# Patient Record
Sex: FEMALE
[De-identification: names, ages, dates, MRNs, and addresses within clinical notes are randomized per-mention and may not be internally consistent; named-entity substitution may affect disease eponyms.]

---

## 2018-10-14 ENCOUNTER — TRANSCRIPTION ENCOUNTER (OUTPATIENT)
Age: 33
End: 2018-10-14

## 2018-10-17 ENCOUNTER — TRANSCRIPTION ENCOUNTER (OUTPATIENT)
Age: 33
End: 2018-10-17

## 2018-11-09 ENCOUNTER — TRANSCRIPTION ENCOUNTER (OUTPATIENT)
Age: 33
End: 2018-11-09

## 2021-05-15 ENCOUNTER — TRANSCRIPTION ENCOUNTER (OUTPATIENT)
Age: 36
End: 2021-05-15

## 2022-01-19 PROBLEM — Z00.00 ENCOUNTER FOR PREVENTIVE HEALTH EXAMINATION: Status: ACTIVE | Noted: 2022-01-19

## 2022-01-20 ENCOUNTER — APPOINTMENT (OUTPATIENT)
Dept: ENDOCRINOLOGY | Facility: CLINIC | Age: 37
End: 2022-01-20
Payer: COMMERCIAL

## 2022-01-20 VITALS
OXYGEN SATURATION: 98 % | RESPIRATION RATE: 17 BRPM | HEIGHT: 63 IN | TEMPERATURE: 97.4 F | SYSTOLIC BLOOD PRESSURE: 137 MMHG | HEART RATE: 115 BPM | DIASTOLIC BLOOD PRESSURE: 82 MMHG | BODY MASS INDEX: 51.91 KG/M2 | WEIGHT: 293 LBS

## 2022-01-20 DIAGNOSIS — Z80.8 FAMILY HISTORY OF MALIGNANT NEOPLASM OF OTHER ORGANS OR SYSTEMS: ICD-10-CM

## 2022-01-20 DIAGNOSIS — Z87.891 PERSONAL HISTORY OF NICOTINE DEPENDENCE: ICD-10-CM

## 2022-01-20 DIAGNOSIS — R94.6 ABNORMAL RESULTS OF THYROID FUNCTION STUDIES: ICD-10-CM

## 2022-01-20 PROCEDURE — 99244 OFF/OP CNSLTJ NEW/EST MOD 40: CPT | Mod: 25

## 2022-01-20 PROCEDURE — 36415 COLL VENOUS BLD VENIPUNCTURE: CPT

## 2022-01-20 RX ORDER — OLMESARTAN MEDOXOMIL / AMLODIPINE BESYLATE / HYDROCHLOROTHIAZIDE 40; 10; 25 MG/1; MG/1; MG/1
40-10-25 TABLET, FILM COATED ORAL DAILY
Refills: 0 | Status: ACTIVE | COMMUNITY

## 2022-01-20 NOTE — REASON FOR VISIT
[Consultation] : a consultation visit [Weight Management/Obesity] : weight management/obesity [Other___] : [unfilled]

## 2022-01-20 NOTE — HISTORY OF PRESENT ILLNESS
[FreeTextEntry1] : 36 year female  referred for thyroid and weight management. \par Ms. Robertson  was struggling with obesity since the age of 5 (weighing about 100 lbs at that age). She's been doing WW for many years, and was able to maintain her weight around 300 lbs. She did not have any issues getting pregnant and weighed about 340 lbs when conceived her child. She's now about 9 months postpartum, and her weight today is 363 lbs despite staying on WW. She feels that her diet that typically would help her is not working and she continues to gain weight. She has not tried anything other diet and saw a nutritionist when she was pregnant. She also tried  phentermine with a minimal success. She was not seen by an endocrinologist yet. Her family has been also struggling with obesity, and her dad weighs about 500 lbs. \par  In addition to the weight gain, she noticed some facial hair growth, scalp hair loss, new acne.  Cycle is regular, never on OCP's. Using condoms. She snores, but was never been tested for JAMES.\par Her mother was diagnosed with a thyroid cancer at the age of 32.Bonnie recalls having normal thyroid functions, but never had a formal thyroid sonogram. Denies history of radiation exposure to head and neck area in a childhood.\par

## 2022-01-20 NOTE — CONSULT LETTER
[Dear  ___] : Dear  [unfilled], [Sincerely,] : Sincerely, [FreeTextEntry1] : Thank you for referring  Ms. Bonnie Robertson to me for evaluation and treatment. Please, see attached consultation note. As always, if there are specific questions you would like to discuss, please feel free to contact me.\par Thank you for the courtesy of this evaluation.\par  [FreeTextEntry3] : Leanne Borjas MD, FACE, ECNU\par

## 2022-01-20 NOTE — ASSESSMENT
[FreeTextEntry1] : 1. Morbid obesity. Cushingoid features\par - Current approaches to weight management are discussed with the patient. Suggested extensive nutritional education program. She will see our RD. Proper dietary restrictions and exercise routines discussed. Medical weight loss therapies were reviewed with the patient- would avoid contrave and qsymia as a first line of therapy given her HTN. Will likely start on Wegovy. R+B of GLP1 agonists reviewed. Bariatric options discussed and referral is given.\par - screen for CS\par - 1 mg ONDST and 24h UFC/meta/normetanephrine\par \par 2. Family history of thyroid cancer\par - monitor thyroid functions\par - thyroid US, and periodical screening advised\par \par RTC post labs/sonogram\par

## 2022-01-21 LAB
ALBUMIN SERPL ELPH-MCNC: 4.5 G/DL
ALP BLD-CCNC: 89 U/L
ALT SERPL-CCNC: 18 U/L
ANION GAP SERPL CALC-SCNC: 13 MMOL/L
AST SERPL-CCNC: 17 U/L
BASOPHILS # BLD AUTO: 0.05 K/UL
BASOPHILS NFR BLD AUTO: 0.6 %
BILIRUB SERPL-MCNC: 0.2 MG/DL
BUN SERPL-MCNC: 14 MG/DL
C PEPTIDE SERPL-MCNC: 5.5 NG/ML
CALCIUM SERPL-MCNC: 9.8 MG/DL
CHLORIDE SERPL-SCNC: 101 MMOL/L
CHOLEST SERPL-MCNC: 200 MG/DL
CO2 SERPL-SCNC: 27 MMOL/L
CREAT SERPL-MCNC: 0.74 MG/DL
EOSINOPHIL # BLD AUTO: 0.18 K/UL
EOSINOPHIL NFR BLD AUTO: 2.3 %
ESTIMATED AVERAGE GLUCOSE: 100 MG/DL
ESTRADIOL SERPL-MCNC: 21 PG/ML
FOLATE SERPL-MCNC: 8 NG/ML
FSH SERPL-MCNC: 8.7 IU/L
GLUCOSE SERPL-MCNC: 93 MG/DL
HBA1C MFR BLD HPLC: 5.1 %
HCG SERPL-MCNC: <1 MIU/ML
HCT VFR BLD CALC: 39.2 %
HDLC SERPL-MCNC: 45 MG/DL
HGB BLD-MCNC: 12.2 G/DL
IMM GRANULOCYTES NFR BLD AUTO: 0.1 %
LDLC SERPL CALC-MCNC: 102 MG/DL
LH SERPL-ACNC: 4.8 IU/L
LYMPHOCYTES # BLD AUTO: 2.14 K/UL
LYMPHOCYTES NFR BLD AUTO: 27.6 %
MAN DIFF?: NORMAL
MCHC RBC-ENTMCNC: 24.3 PG
MCHC RBC-ENTMCNC: 31.1 GM/DL
MCV RBC AUTO: 78.1 FL
MONOCYTES # BLD AUTO: 0.62 K/UL
MONOCYTES NFR BLD AUTO: 8 %
NEUTROPHILS # BLD AUTO: 4.76 K/UL
NEUTROPHILS NFR BLD AUTO: 61.4 %
NONHDLC SERPL-MCNC: 155 MG/DL
PLATELET # BLD AUTO: 335 K/UL
POTASSIUM SERPL-SCNC: 4.3 MMOL/L
PROLACTIN SERPL-MCNC: 16.4 NG/ML
PROT SERPL-MCNC: 7.5 G/DL
RBC # BLD: 5.02 M/UL
RBC # FLD: 14.2 %
SODIUM SERPL-SCNC: 141 MMOL/L
T4 FREE SERPL-MCNC: 1.9 NG/DL
THYROGLOB AB SERPL-ACNC: <20 IU/ML
THYROPEROXIDASE AB SERPL IA-ACNC: 278 IU/ML
TRIGL SERPL-MCNC: 267 MG/DL
VIT B12 SERPL-MCNC: 373 PG/ML
WBC # FLD AUTO: 7.76 K/UL

## 2022-01-25 LAB — SHBG SERPL-SCNC: 61.4 NMOL/L

## 2022-01-27 LAB
TESTOST FREE SERPL-MCNC: 1.6 PG/ML
TESTOST SERPL-MCNC: 26.1 NG/DL

## 2022-03-18 LAB
25(OH)D3 SERPL-MCNC: 11.9 NG/ML
ACTH SER-ACNC: <1.5 PG/ML
CORTIS 24H UR-MCNC: 24 H
CORTIS 24H UR-MRATE: 6.8 MCG/24 H
CORTIS SERPL-MCNC: 0.5 UG/DL
CREAT 24H UR-MCNC: 1.7 G/24 H
CREAT ?TM UR-MCNC: 106 MG/DL
DEXAMETHASONE LEVEL: 416 NG/DL
LEPTIN SERPL-MCNC: 40 NG/ML
METANEPH 24H UR-MRATE: 78 UG/24 HR
METANEPHRINE, PL: <10 PG/ML
METANEPHS 24H UR-MCNC: 47 UG/L
NORMETANEPHRINE 24 HR URINE: 442 UG/24 HR
NORMETANEPHRINE 24H UR-MCNC: 268 UG/L
NORMETANEPHRINE, PL: 113.8 PG/ML
PROT ?TM UR-MCNC: 24 HR
SPECIMEN VOL 24H UR: 1650 ML
SPECIMEN VOL 24H UR: 1650 ML
TSH SERPL-ACNC: <0.01 UIU/ML

## 2022-03-28 LAB
T3 SERPL-MCNC: 143 NG/DL
T4 FREE SERPL-MCNC: 1.2 NG/DL
THYROGLOB AB SERPL-ACNC: <20 IU/ML
THYROPEROXIDASE AB SERPL IA-ACNC: 224 IU/ML
TSH RECEPTOR AB: 2.86 IU/L
TSH SERPL-ACNC: 0.06 UIU/ML
TSI ACT/NOR SER: 0.88 IU/L

## 2022-05-11 ENCOUNTER — APPOINTMENT (OUTPATIENT)
Dept: ENDOCRINOLOGY | Facility: CLINIC | Age: 37
End: 2022-05-11
Payer: COMMERCIAL

## 2022-05-11 VITALS
RESPIRATION RATE: 18 BRPM | WEIGHT: 293 LBS | HEART RATE: 99 BPM | BODY MASS INDEX: 51.91 KG/M2 | SYSTOLIC BLOOD PRESSURE: 96 MMHG | TEMPERATURE: 98.3 F | DIASTOLIC BLOOD PRESSURE: 72 MMHG | OXYGEN SATURATION: 98 % | HEIGHT: 63 IN

## 2022-05-11 DIAGNOSIS — E55.9 VITAMIN D DEFICIENCY, UNSPECIFIED: ICD-10-CM

## 2022-05-11 PROCEDURE — 36415 COLL VENOUS BLD VENIPUNCTURE: CPT

## 2022-05-11 PROCEDURE — 99214 OFFICE O/P EST MOD 30 MIN: CPT | Mod: 25

## 2022-05-11 NOTE — HISTORY OF PRESENT ILLNESS
[FreeTextEntry1] : 36 year female  f/u for thyroid and weight management. \par \par *** May 11, 2022 ***\par \par on MMI 5 mg . feels ok overall, but very concerned with a progressive weight gain\par did not see a bariatric surgeon yet\par 1mg ONDST and 24h UFC- normal\par \par leptin- 40 (3.3-18.3)\par \par Thyr US (3/25/22)-right lower pole 1.6 cm hypoechoic nodule.  Left isthmic/left medial pole junction 1.3 cm complex hypoechoic nodule.\par FNA (4/4/22)-right lower pole nodule–benign follicular nodule compatible with lymphocytic thyroiditis, left upper mid pole–benign follicular nodule compatible with lymphocytic thyroiditis\par \par HPI:\par Ms. Robertson  was struggling with obesity since the age of 5 (weighing about 100 lbs at that age). She's been doing WW for many years, and was able to maintain her weight around 300 lbs. She did not have any issues getting pregnant and weighed about 340 lbs when conceived her child. She's now about 9 months postpartum, and her weight today is 363 lbs despite staying on WW. She feels that her diet that typically would help her is not working and she continues to gain weight. She has not tried anything other diet and saw a nutritionist when she was pregnant. She also tried  phentermine with a minimal success. She was not seen by an endocrinologist yet. Her family has been also struggling with obesity, and her dad weighs about 500 lbs. \par  In addition to the weight gain, she noticed some facial hair growth, scalp hair loss, new acne.  Cycle is regular, never on OCP's. Using condoms. She snores, but was never been tested for JAMES.\par Her mother was diagnosed with a thyroid cancer at the age of 32.Bonnie recalls having normal thyroid functions, but never had a formal thyroid sonogram. Denies history of radiation exposure to head and neck area in a childhood.\par

## 2022-05-11 NOTE — REASON FOR VISIT
[Follow - Up] : a follow-up visit [Hyperthyroidism] : hyperthyroidism [Weight Management/Obesity] : weight management/obesity

## 2022-05-11 NOTE — ASSESSMENT
[FreeTextEntry1] : 1. Morbid obesity. Cushingoid features\par - Current approaches to weight management are discussed with the patient. Suggested extensive nutritional education program. She will see our RD. Proper dietary restrictions and exercise routines discussed. Medical weight loss therapies were reviewed with the patient- would avoid contrave and qsymia as a first line of therapy given her HTN.  start on Wegovy. R+B. Bariatric options discussed and referral is given again\par - genetic counseling\par \par 2. Graves disease\par - MMI 5 mg, pending labs\par \par 3. MNG. Family history of thyroid cancer\par - s/p benign FNA\par - repeat thyroid US in 10/22\par \par RTC post labs/sonogram\par

## 2022-05-13 LAB
25(OH)D3 SERPL-MCNC: 21.8 NG/ML
ALBUMIN SERPL ELPH-MCNC: 4.6 G/DL
ALP BLD-CCNC: 94 U/L
ALT SERPL-CCNC: 14 U/L
ANION GAP SERPL CALC-SCNC: 12 MMOL/L
AST SERPL-CCNC: 14 U/L
BASOPHILS # BLD AUTO: 0.05 K/UL
BASOPHILS NFR BLD AUTO: 0.7 %
BILIRUB SERPL-MCNC: 0.2 MG/DL
BUN SERPL-MCNC: 14 MG/DL
CALCIUM SERPL-MCNC: 9.4 MG/DL
CHLORIDE SERPL-SCNC: 100 MMOL/L
CO2 SERPL-SCNC: 27 MMOL/L
CREAT SERPL-MCNC: 0.72 MG/DL
EGFR: 112 ML/MIN/1.73M2
EOSINOPHIL # BLD AUTO: 0.17 K/UL
EOSINOPHIL NFR BLD AUTO: 2.3 %
GLUCOSE SERPL-MCNC: 89 MG/DL
HCT VFR BLD CALC: 41.8 %
HGB BLD-MCNC: 13.1 G/DL
IMM GRANULOCYTES NFR BLD AUTO: 0.5 %
LYMPHOCYTES # BLD AUTO: 1.93 K/UL
LYMPHOCYTES NFR BLD AUTO: 26.4 %
MAN DIFF?: NORMAL
MCHC RBC-ENTMCNC: 24.4 PG
MCHC RBC-ENTMCNC: 31.3 GM/DL
MCV RBC AUTO: 77.8 FL
MONOCYTES # BLD AUTO: 0.45 K/UL
MONOCYTES NFR BLD AUTO: 6.2 %
NEUTROPHILS # BLD AUTO: 4.66 K/UL
NEUTROPHILS NFR BLD AUTO: 63.9 %
PLATELET # BLD AUTO: 325 K/UL
POTASSIUM SERPL-SCNC: 4.4 MMOL/L
PROT SERPL-MCNC: 7.8 G/DL
RBC # BLD: 5.37 M/UL
RBC # FLD: 15.1 %
SODIUM SERPL-SCNC: 140 MMOL/L
T3 SERPL-MCNC: 136 NG/DL
T4 FREE SERPL-MCNC: 1 NG/DL
TSH SERPL-ACNC: 0.89 UIU/ML
TSI ACT/NOR SER: 0.69 IU/L
WBC # FLD AUTO: 7.3 K/UL

## 2022-05-15 LAB — TSH RECEPTOR AB: 2.23 IU/L

## 2022-05-18 ENCOUNTER — NON-APPOINTMENT (OUTPATIENT)
Age: 37
End: 2022-05-18

## 2022-05-19 ENCOUNTER — TRANSCRIPTION ENCOUNTER (OUTPATIENT)
Age: 37
End: 2022-05-19

## 2022-05-23 ENCOUNTER — APPOINTMENT (OUTPATIENT)
Dept: DISASTER EMERGENCY | Facility: HOSPITAL | Age: 37
End: 2022-05-23

## 2022-06-23 ENCOUNTER — NON-APPOINTMENT (OUTPATIENT)
Age: 37
End: 2022-06-23

## 2022-06-29 ENCOUNTER — NON-APPOINTMENT (OUTPATIENT)
Age: 37
End: 2022-06-29

## 2022-08-02 ENCOUNTER — NON-APPOINTMENT (OUTPATIENT)
Age: 37
End: 2022-08-02

## 2022-08-03 RX ORDER — SEMAGLUTIDE 0.25 MG/.5ML
0.25 INJECTION, SOLUTION SUBCUTANEOUS
Qty: 1 | Refills: 0 | Status: DISCONTINUED | COMMUNITY
Start: 2022-05-11 | End: 2022-08-03

## 2022-08-05 ENCOUNTER — APPOINTMENT (OUTPATIENT)
Dept: BARIATRICS | Facility: CLINIC | Age: 37
End: 2022-08-05

## 2022-08-05 VITALS — BODY MASS INDEX: 51.91 KG/M2 | HEIGHT: 63 IN | WEIGHT: 293 LBS

## 2022-08-05 DIAGNOSIS — Z78.9 OTHER SPECIFIED HEALTH STATUS: ICD-10-CM

## 2022-08-05 DIAGNOSIS — Z13.29 ENCOUNTER FOR SCREENING FOR OTHER SUSPECTED ENDOCRINE DISORDER: ICD-10-CM

## 2022-08-05 DIAGNOSIS — Z13.220 ENCOUNTER FOR SCREENING FOR LIPOID DISORDERS: ICD-10-CM

## 2022-08-05 DIAGNOSIS — Z82.49 FAMILY HISTORY OF ISCHEMIC HEART DISEASE AND OTHER DISEASES OF THE CIRCULATORY SYSTEM: ICD-10-CM

## 2022-08-05 DIAGNOSIS — Z13.0 ENCOUNTER FOR SCREENING FOR DISEASES OF THE BLOOD AND BLOOD-FORMING ORGANS AND CERTAIN DISORDERS INVOLVING THE IMMUNE MECHANISM: ICD-10-CM

## 2022-08-05 DIAGNOSIS — Z92.29 PERSONAL HISTORY OF OTHER DRUG THERAPY: ICD-10-CM

## 2022-08-05 DIAGNOSIS — Z13.0 ENCOUNTER FOR SCREENING FOR OTHER SUSPECTED ENDOCRINE DISORDER: ICD-10-CM

## 2022-08-05 DIAGNOSIS — Z13.228 ENCOUNTER FOR SCREENING FOR OTHER SUSPECTED ENDOCRINE DISORDER: ICD-10-CM

## 2022-08-05 DIAGNOSIS — Z13.228 ENCOUNTER FOR SCREENING FOR OTHER METABOLIC DISORDERS: ICD-10-CM

## 2022-08-05 DIAGNOSIS — Z83.49 FAMILY HISTORY OF OTHER ENDOCRINE, NUTRITIONAL AND METABOLIC DISEASES: ICD-10-CM

## 2022-08-05 PROCEDURE — 99205 OFFICE O/P NEW HI 60 MIN: CPT | Mod: 95,25

## 2022-08-08 PROBLEM — Z83.49 FAMILY HISTORY OF OBESITY: Status: ACTIVE | Noted: 2022-08-03

## 2022-08-08 PROBLEM — Z13.220 ENCOUNTER FOR SCREENING FOR LIPID DISORDER: Status: RESOLVED | Noted: 2022-08-03 | Resolved: 2022-08-17

## 2022-08-08 PROBLEM — Z13.0 SCREENING FOR OTHER DISORDERS OF BLOOD AND BLOOD-FORMING ORGANS: Status: ACTIVE | Noted: 2022-08-03

## 2022-08-08 PROBLEM — Z82.49 FAMILY HISTORY OF HYPERTENSION: Status: ACTIVE | Noted: 2022-08-03

## 2022-08-08 PROBLEM — Z13.29 SCREENING FOR OTHER AND UNSPECIFIED ENDOCRINE, NUTRITIONAL, METABOLIC AND IMMUNITY DISORDERS: Status: ACTIVE | Noted: 2022-08-03

## 2022-08-08 PROBLEM — Z92.29 COVID-19 VACCINE SERIES COMPLETED: Status: ACTIVE | Noted: 2022-08-03

## 2022-08-08 PROBLEM — Z13.228 ENCOUNTER FOR SCREENING FOR OTHER METABOLIC DISORDERS: Status: ACTIVE | Noted: 2022-08-03

## 2022-08-08 PROBLEM — Z78.9 NO PERTINENT PAST MEDICAL HISTORY: Status: RESOLVED | Noted: 2022-08-03 | Resolved: 2022-08-08

## 2022-08-08 NOTE — HISTORY OF PRESENT ILLNESS
[Home] : at home, [unfilled] , at the time of the visit. [Medical Office: ([facility])___] : at the medical office located in  [Verbal consent obtained from patient] : the patient, [unfilled] [de-identified] : 37 year old woman with a long-standing history of morbid obesity, who has attempted numerous weight loss treatments without long term success. Patient is familiar with the Laparoscopic Adjustable Gastric Band, the Laparoscopic Sleeve Gastrectomy and the Laparoscopic Gastric Bypass. Patient presents today to discuss options for surgery, specifically the Laparoscopic Sleeve Gastrectomy.

## 2022-08-08 NOTE — ASSESSMENT
[FreeTextEntry1] : 37 year old woman with long-standing history of morbid obesity presents today to discuss options for weight loss surgery.  I had an extensive discussion with the patient reviewing the Laparoscopic Sleeve Gastrectomy. Diagrams were used. All questions were answered.  \par \par Complications were discussed including but not limited to: vitamin and protein deficiencies, pneumonia, urinary infection, wound infection, leaks/peritonitis possibly requiring intraabdominal drains or reoperation, bleeding, DVT, pulmonary embolus, severe reflux, sleeve obstruction, abdominal wall hernias, revisions, death, inadequate weight loss. The importance of vitamins and protein supplementation was stressed, as was the importance of follow-up and exercise. \par \par Patient encouraged to make dietary and lifestyle changes in preparation for surgery.\par \par Patient was instructed to avoid pregnancy for 12-18 months post -op, until patient is at a stable weight, has normal vitamin levels and on prenatal vitamins. \par \par Patient with a long history of morbid obesity.She is interested in the Laparoscopic Sleeve Gastrectomy. She was given written material to review.  Pre-operative evaluations were reviewed. She will need to lose weight prior to surgery and will be seen again prior to surgery.She was told to call with any questions.\par \par Patient is nervous about surgery and ideally would like to lose weight on her own.  Discussed weight management with patient.  Also discussed that with a BMI of 68 it would be unlikely that she would achieve desired weight loss and weight maintenance with diet, exercise and weight loss medications alone.\par \par 30 minutes spent discussing importance of dietary and lifestyle changes for weight loss and long-term weight maintenance.  Specifically discussed 3 protein focused meals per day, increase water intake, activity to start with cardio and later to add strength training.  Good sleep hygiene and stress reduction were also discussed with patient.  Patient does self-report stressed eating.\par

## 2022-08-08 NOTE — HISTORY OF PRESENT ILLNESS
[Home] : at home, [unfilled] , at the time of the visit. [Medical Office: ([facility])___] : at the medical office located in  [Verbal consent obtained from patient] : the patient, [unfilled] [de-identified] : 37 year old woman with a long-standing history of morbid obesity, who has attempted numerous weight loss treatments without long term success. Patient is familiar with the Laparoscopic Adjustable Gastric Band, the Laparoscopic Sleeve Gastrectomy and the Laparoscopic Gastric Bypass. Patient presents today to discuss options for surgery, specifically the Laparoscopic Sleeve Gastrectomy.

## 2022-08-08 NOTE — REVIEW OF SYSTEMS
[Fatigue] : fatigue [Lower Ext Edema] : lower extremity edema [Skin Rash] : skin rash [Negative] : Allergic/Immunologic [de-identified] : Numbness/tingling

## 2022-08-08 NOTE — CONSULT LETTER
[Dear  ___] : Dear  [unfilled], [Consult Letter:] : I had the pleasure of evaluating your patient, [unfilled]. [Please see my note below.] : Please see my note below. [Consult Closing:] : Thank you very much for allowing me to participate in the care of this patient.  If you have any questions, please do not hesitate to contact me. [Sincerely,] : Sincerely, [FreeTextEntry3] : Carmela Gaitan MD, FACS

## 2022-08-08 NOTE — REVIEW OF SYSTEMS
[Fatigue] : fatigue [Lower Ext Edema] : lower extremity edema [Skin Rash] : skin rash [Negative] : Allergic/Immunologic [de-identified] : Numbness/tingling

## 2022-08-30 ENCOUNTER — APPOINTMENT (OUTPATIENT)
Dept: ENDOCRINOLOGY | Facility: CLINIC | Age: 37
End: 2022-08-30

## 2022-08-30 VITALS
SYSTOLIC BLOOD PRESSURE: 120 MMHG | WEIGHT: 293 LBS | OXYGEN SATURATION: 98 % | TEMPERATURE: 97 F | BODY MASS INDEX: 51.91 KG/M2 | HEIGHT: 63 IN | RESPIRATION RATE: 18 BRPM | HEART RATE: 115 BPM | DIASTOLIC BLOOD PRESSURE: 60 MMHG

## 2022-08-30 PROCEDURE — 99214 OFFICE O/P EST MOD 30 MIN: CPT | Mod: 25

## 2022-08-30 PROCEDURE — 36415 COLL VENOUS BLD VENIPUNCTURE: CPT

## 2022-08-30 NOTE — HISTORY OF PRESENT ILLNESS
[FreeTextEntry1] : 36 year female  f/u for thyroid and weight management. \par \par *** Aug 30, 2022 ***\par \par had a covid in may. with a severe post covid fatigue\par saw Dr. Gaitan- interested in a sleeve gastrectomy\par gaining weight.\par wegovy approved, but is on backorder\par \par *** May 11, 2022 ***\par \par on MMI 5 mg . feels ok overall, but very concerned with a progressive weight gain\par did not see a bariatric surgeon yet\par 1mg ONDST and 24h UFC- normal\par \par leptin- 40 (3.3-18.3)\par \par Thyr US (3/25/22)-right lower pole 1.6 cm hypoechoic nodule.  Left isthmic/left medial pole junction 1.3 cm complex hypoechoic nodule.\par FNA (4/4/22)-right lower pole nodule–benign follicular nodule compatible with lymphocytic thyroiditis, left upper mid pole–benign follicular nodule compatible with lymphocytic thyroiditis\par \par HPI:\par Ms. Robertson  was struggling with obesity since the age of 5 (weighing about 100 lbs at that age). She's been doing WW for many years, and was able to maintain her weight around 300 lbs. She did not have any issues getting pregnant and weighed about 340 lbs when conceived her child. She's now about 9 months postpartum, and her weight today is 363 lbs despite staying on WW. She feels that her diet that typically would help her is not working and she continues to gain weight. She has not tried anything other diet and saw a nutritionist when she was pregnant. She also tried  phentermine with a minimal success. She was not seen by an endocrinologist yet. Her family has been also struggling with obesity, and her dad weighs about 500 lbs. \par  In addition to the weight gain, she noticed some facial hair growth, scalp hair loss, new acne.  Cycle is regular, never on OCP's. Using condoms. She snores, but was never been tested for JAMES.\par Her mother was diagnosed with a thyroid cancer at the age of 32.Bonnie recalls having normal thyroid functions, but never had a formal thyroid sonogram. Denies history of radiation exposure to head and neck area in a childhood.\par

## 2022-08-30 NOTE — ASSESSMENT
[FreeTextEntry1] : 1. Morbid obesity. Cushingoid features\par - Current approaches to weight management are discussed with the patient. Suggested extensive nutritional education program. She will see our RD. Proper dietary restrictions and exercise routines discussed. Medical weight loss therapies were reviewed with the patient- would avoid contrave and qsymia as a first line of therapy given her HTN.  prefer weekly semaglutide. While Wegovy is on a backorder, will use Ozempic 0.25 mg qw and uptitrate( R+B). Bariatric options discussed and referral is given again\par - genetic counseling\par \par 2. Graves disease\par - MMI 5 mg, pending labs\par \par 3. MNG. Family history of thyroid cancer\par - s/p benign FNA\par - repeat thyroid US in 10/22\par \par RTC 3 months\par

## 2022-09-06 LAB
25(OH)D3 SERPL-MCNC: 23.1 NG/ML
ALBUMIN SERPL ELPH-MCNC: 4.5 G/DL
ALP BLD-CCNC: 83 U/L
ALT SERPL-CCNC: 16 U/L
ANION GAP SERPL CALC-SCNC: 14 MMOL/L
AST SERPL-CCNC: 14 U/L
BASOPHILS # BLD AUTO: 0.07 K/UL
BASOPHILS NFR BLD AUTO: 0.9 %
BILIRUB SERPL-MCNC: 0.2 MG/DL
BUN SERPL-MCNC: 11 MG/DL
CALCIUM SERPL-MCNC: 9.5 MG/DL
CHLORIDE SERPL-SCNC: 100 MMOL/L
CO2 SERPL-SCNC: 27 MMOL/L
CREAT SERPL-MCNC: 0.78 MG/DL
EGFR: 101 ML/MIN/1.73M2
EOSINOPHIL # BLD AUTO: 0.07 K/UL
EOSINOPHIL NFR BLD AUTO: 0.9 %
ESTIMATED AVERAGE GLUCOSE: 105 MG/DL
GLUCOSE SERPL-MCNC: 97 MG/DL
HBA1C MFR BLD HPLC: 5.3 %
HCT VFR BLD CALC: 41.7 %
HGB BLD-MCNC: 12.9 G/DL
IMM GRANULOCYTES NFR BLD AUTO: 0.5 %
LYMPHOCYTES # BLD AUTO: 2.31 K/UL
LYMPHOCYTES NFR BLD AUTO: 29.9 %
MAN DIFF?: NORMAL
MCHC RBC-ENTMCNC: 24.6 PG
MCHC RBC-ENTMCNC: 30.9 GM/DL
MCV RBC AUTO: 79.6 FL
MONOCYTES # BLD AUTO: 0.62 K/UL
MONOCYTES NFR BLD AUTO: 8 %
NEUTROPHILS # BLD AUTO: 4.62 K/UL
NEUTROPHILS NFR BLD AUTO: 59.8 %
PLATELET # BLD AUTO: 311 K/UL
POTASSIUM SERPL-SCNC: 4.4 MMOL/L
PROT SERPL-MCNC: 7.3 G/DL
RBC # BLD: 5.24 M/UL
RBC # FLD: 15.4 %
SODIUM SERPL-SCNC: 140 MMOL/L
T3 SERPL-MCNC: 160 NG/DL
T4 FREE SERPL-MCNC: 1 NG/DL
THYROGLOB AB SERPL-ACNC: <20 IU/ML
THYROPEROXIDASE AB SERPL IA-ACNC: 89.6 IU/ML
TSH RECEPTOR AB: <1.1 IU/L
TSH SERPL-ACNC: 2.54 UIU/ML
TSI ACT/NOR SER: 0.28 IU/L
WBC # FLD AUTO: 7.73 K/UL

## 2022-09-21 ENCOUNTER — RX RENEWAL (OUTPATIENT)
Age: 37
End: 2022-09-21

## 2022-10-25 ENCOUNTER — APPOINTMENT (OUTPATIENT)
Dept: BARIATRICS/WEIGHT MGMT | Facility: CLINIC | Age: 37
End: 2022-10-25

## 2022-11-08 ENCOUNTER — APPOINTMENT (OUTPATIENT)
Dept: BARIATRICS/WEIGHT MGMT | Facility: CLINIC | Age: 37
End: 2022-11-08

## 2022-12-13 ENCOUNTER — APPOINTMENT (OUTPATIENT)
Dept: ENDOCRINOLOGY | Facility: CLINIC | Age: 37
End: 2022-12-13

## 2022-12-13 VITALS
TEMPERATURE: 98 F | DIASTOLIC BLOOD PRESSURE: 68 MMHG | OXYGEN SATURATION: 98 % | SYSTOLIC BLOOD PRESSURE: 118 MMHG | WEIGHT: 293 LBS | HEIGHT: 63 IN | BODY MASS INDEX: 51.91 KG/M2 | RESPIRATION RATE: 16 BRPM | HEART RATE: 112 BPM

## 2022-12-13 PROCEDURE — 36415 COLL VENOUS BLD VENIPUNCTURE: CPT

## 2022-12-13 PROCEDURE — 99214 OFFICE O/P EST MOD 30 MIN: CPT | Mod: 25

## 2022-12-13 NOTE — ASSESSMENT
[FreeTextEntry1] : 1. Morbid obesity. Cushingoid features\par - Current approaches to weight management are discussed with the patient. Suggested extensive nutritional education program. She will see our RD. Proper dietary restrictions and exercise routines discussed. Medical weight loss therapies were reviewed with the patient- would avoid contrave and qsymia as a first line of therapy given her HTN.  prefer weekly semaglutide. While Wegovy is on a backorder, will use Ozempic 0.25 mg qw and uptitrate( R+B). Advised to start asap. To see a bariatric surgeon as scheduled\par - genetic counseling\par \par 2. Graves disease\par - MMI 2.5 mg, pending labs\par - will try to downtitrate\par \par 3. MNG. Family history of thyroid cancer\par - s/p benign FNA\par - repeat thyroid US in  4/23\par \par RTC 3 months\par

## 2022-12-13 NOTE — HISTORY OF PRESENT ILLNESS
[FreeTextEntry1] : 37 year female  f/u for thyroid and weight management. \par \par *** Dec 13, 2022 ***\par \par picked up ozempic but did not start yet\par taking MMI 2.5 mg qw\par gained more weight, but is trying a new diet\par seeing Dr. Gaitan in february\par last TSH- 2.5\par \par Thyr US(10/5/22)- enlarged thyroid. Multiple b/l nodules, including stable RLP 1.5 cm hypo (prev FNAd) and LUP 1.4 complex \par *** Aug 30, 2022 ***\par \par had a covid in may. with a severe post covid fatigue\par saw Dr. Gaitan- interested in a sleeve gastrectomy\par gaining weight.\par wegovy approved, but is on backorder\par \par *** May 11, 2022 ***\par \par on MMI 5 mg . feels ok overall, but very concerned with a progressive weight gain\par did not see a bariatric surgeon yet\par 1mg ONDST and 24h UFC- normal\par \par leptin- 40 (3.3-18.3)\par \par Thyr US (3/25/22)-right lower pole 1.6 cm hypoechoic nodule.  Left isthmic/left medial pole junction 1.3 cm complex hypoechoic nodule.\par FNA (4/4/22)-right lower pole nodule–benign follicular nodule compatible with lymphocytic thyroiditis, left upper mid pole–benign follicular nodule compatible with lymphocytic thyroiditis\par \par HPI:\par Ms. Robertson  was struggling with obesity since the age of 5 (weighing about 100 lbs at that age). She's been doing WW for many years, and was able to maintain her weight around 300 lbs. She did not have any issues getting pregnant and weighed about 340 lbs when conceived her child. She's now about 9 months postpartum, and her weight today is 363 lbs despite staying on WW. She feels that her diet that typically would help her is not working and she continues to gain weight. She has not tried anything other diet and saw a nutritionist when she was pregnant. She also tried  phentermine with a minimal success. She was not seen by an endocrinologist yet. Her family has been also struggling with obesity, and her dad weighs about 500 lbs. \par  In addition to the weight gain, she noticed some facial hair growth, scalp hair loss, new acne.  Cycle is regular, never on OCP's. Using condoms. She snores, but was never been tested for JAMES.\par Her mother was diagnosed with a thyroid cancer at the age of 32.Bonnie recalls having normal thyroid functions, but never had a formal thyroid sonogram. Denies history of radiation exposure to head and neck area in a childhood.\par

## 2022-12-14 ENCOUNTER — NON-APPOINTMENT (OUTPATIENT)
Age: 37
End: 2022-12-14

## 2022-12-14 LAB
ALBUMIN SERPL ELPH-MCNC: 4.4 G/DL
ALP BLD-CCNC: 81 U/L
ALT SERPL-CCNC: 16 U/L
ANION GAP SERPL CALC-SCNC: 14 MMOL/L
AST SERPL-CCNC: 16 U/L
BASOPHILS # BLD AUTO: 0.09 K/UL
BASOPHILS NFR BLD AUTO: 0.9 %
BILIRUB SERPL-MCNC: <0.2 MG/DL
BUN SERPL-MCNC: 15 MG/DL
CALCIUM SERPL-MCNC: 9.2 MG/DL
CHLORIDE SERPL-SCNC: 99 MMOL/L
CO2 SERPL-SCNC: 27 MMOL/L
CREAT SERPL-MCNC: 0.81 MG/DL
EGFR: 96 ML/MIN/1.73M2
EOSINOPHIL # BLD AUTO: 0.06 K/UL
EOSINOPHIL NFR BLD AUTO: 0.6 %
GLUCOSE SERPL-MCNC: 104 MG/DL
HCT VFR BLD CALC: 41.3 %
HGB BLD-MCNC: 12.7 G/DL
IMM GRANULOCYTES NFR BLD AUTO: 0.3 %
LYMPHOCYTES # BLD AUTO: 2.36 K/UL
LYMPHOCYTES NFR BLD AUTO: 22.9 %
MAN DIFF?: NORMAL
MCHC RBC-ENTMCNC: 23.9 PG
MCHC RBC-ENTMCNC: 30.8 GM/DL
MCV RBC AUTO: 77.6 FL
MONOCYTES # BLD AUTO: 0.72 K/UL
MONOCYTES NFR BLD AUTO: 7 %
NEUTROPHILS # BLD AUTO: 7.05 K/UL
NEUTROPHILS NFR BLD AUTO: 68.3 %
PLATELET # BLD AUTO: 341 K/UL
POTASSIUM SERPL-SCNC: 4.1 MMOL/L
PROT SERPL-MCNC: 7.5 G/DL
RBC # BLD: 5.32 M/UL
RBC # FLD: 15.2 %
SODIUM SERPL-SCNC: 140 MMOL/L
T3 SERPL-MCNC: 142 NG/DL
T4 FREE SERPL-MCNC: 1.1 NG/DL
TSH SERPL-ACNC: 1.14 UIU/ML
WBC # FLD AUTO: 10.31 K/UL

## 2022-12-16 LAB
TSH RECEPTOR AB: <1.1 IU/L
TSI ACT/NOR SER: 0.16 IU/L

## 2023-01-24 ENCOUNTER — APPOINTMENT (OUTPATIENT)
Dept: BARIATRICS/WEIGHT MGMT | Facility: CLINIC | Age: 38
End: 2023-01-24
Payer: COMMERCIAL

## 2023-01-24 DIAGNOSIS — Z78.9 OTHER SPECIFIED HEALTH STATUS: ICD-10-CM

## 2023-01-24 PROCEDURE — 90791 PSYCH DIAGNOSTIC EVALUATION: CPT | Mod: 95

## 2023-01-24 NOTE — CURRENT PSYCHIATRIC SYMPTOMS
[Depressed Mood] : no depressed mood [Decreased Concentration] : no decrease in concentrating ability [Insomnia] : no insomnia disorder [Euphoria] : no euphoria [Dec Need For Sleep] : no decreased need for sleep [Thought Disorder] : ~T a thought disorder was not noted [de-identified] : excessive guilt regarding her weight [de-identified] : excessive worry re: taking meds, surgery, making the wrong decisions - onset 9/11 as she was in the city; hx of PAs over 5 yrs ago [FreeTextEntry1] : Denies all psyc tx hx.  She was prescribed Xanax by her PCP for fear of flying which she took once in 2016.

## 2023-01-24 NOTE — HISTORY OF PRESENT ILLNESS
[Genetics] : genetics [Large Portions] : large portions [Emotional Eating] : emotional eating [Decrease Activity] : decrease activity [Bulimia Nervosa] : Bulimia Nervosa [Emotional Eating] : emotional eating  [Mindless Eating] : mindless eating [Quantity Eating] : quantity eating [Poor Choices] : poor choices [Secretive Eating] : secretive eating  [de-identified] : Pt's motivation for surgery is to improve her health, including HTN.  Per pt, her highest weight was 418 lbs in 2007 and her lowest weight was 248 lbs in 2009.  Her stated goal weight is 250 lbs and she expresses intent to make behavioral and dietary changes towards obtaining optimal results. [de-identified] : sleeve gastrectomy [de-identified] : 1 yr:  speaking with others who have had bariatric surgery; online reading; discussions with surgeon; will be attending nutrition education class; and reading educational materials provided by surgeon [de-identified] : during her teens.  Pt denies recent ED hx and bxs, including binge eating.\par  [de-identified] : A current typical day of eating is reported as follows:\par B:  skips\par L:  salad w/chicken, protein shake and either cheese or crackers\par S:  fruit or toast\par D:  [employer] premade meal (e.g., chicken tacos, meatloaf, chicken w/rice)\par S:  cookie or popcorn\par Drinks water only.  Quit diet soda since seeing Dr. Gaitan. [de-identified] : Weight Watchers several times, My Fitness Pal/luz maria counting, self monitoring food intake, SlimFast, juice cleanses, phentermine and increased physical activity (, counting steps, joining a gym).  Despite multiple attempts at diet and exercise modifications, patient reports inability to achieve and maintain signficant weight loss.

## 2023-01-24 NOTE — REASON FOR VISIT
[Home] : at home, [unfilled] , at the time of the visit. [Other Location: e.g. Home (Enter Location, City,State)___] : at [unfilled] [Patient] : the patient [Initial Consult] : an initial consult for [Morbid Obesity (BMI>40)] : morbid obesity (bmi>40) [Referring By:  ___] : ~Tahir Ln~ was referred for psychological evaluation by Dr. CRUZ [Attempted Weight Loss] : attempted weight loss [Commitment to Modified Lifestyle] : commitment to a modified lifestyle pre and post surgery [Difficulties with Diet Compliance] : difficulties with diet compliance  [Expectations of Outcome] : expectations of outcome [Motivation for Selecting Surgery] : motivation for selecting surgery [Strength of Social Support System] : strength of social support system [Patient Understands Data May be Shared] : patient understands that the information discussed during the evaluation would be shared with referring provider and possibly with ~his/her~ insurance provider [de-identified] : for evaluation of psychological appropriateness for bariatric surgery [de-identified] : Confidentiality and its limitations were explained.

## 2023-01-24 NOTE — DISCUSSION/SUMMARY
[Educational materials provided] : Educational materials provided [Behavior plan developed] : Behavior plan developed [Strategies to improve adherence identified] : Strategies to improve adherence identified [Develop and refine illness management to behavior plan] : Develop and refine illness management to behavior plan [Identify, practice refine strategies to promote adherence to regimen] : Identify, practice refine strategies to promote adherence to regimen [Referral for external behavioral health services] : Referral for external behavioral health services [Addtnl Health & Behavior Intervention: Individual] : Additional Health and Behavior Intervention: Individual [FreeTextEntry1] : Based on the information presented in this assessment, Ms. CAROLINA is not ready to move forward with surgery at this time due excessive worry regarding complications and BMI > 60.  She will work with writer and RD on making behavioral and dietary changes. [de-identified] : MATHIEU\par Psychological factors (overeating, poor dietary choices) affecting other medical conditions (obesity and associated medical sequelae)\par Obesity\par BN, by hx, In Full Remission [de-identified] : 1.  have B daily\par 2.  increase water intake\par 3.  improve sleep \par 4.  recommended CBT for anxiety - seek provider through her ins co
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
## 2023-01-24 NOTE — PHYSICAL EXAM
[Normal] : good [AH] : no auditory hallucinations [VH] : no visual hallucinations [Suicidal Ideation] : no suicidal ideation [Homicidal Ideation] : no homicidal ideation [FreeTextEntry8] : "okay" [FreeTextEntry9] : tearful but appropriate to content

## 2023-02-21 ENCOUNTER — APPOINTMENT (OUTPATIENT)
Dept: BARIATRICS | Facility: CLINIC | Age: 38
End: 2023-02-21
Payer: COMMERCIAL

## 2023-02-21 VITALS — HEIGHT: 63 IN | BODY MASS INDEX: 51.91 KG/M2 | WEIGHT: 293 LBS

## 2023-02-21 DIAGNOSIS — I10 ESSENTIAL (PRIMARY) HYPERTENSION: ICD-10-CM

## 2023-02-21 PROCEDURE — 99214 OFFICE O/P EST MOD 30 MIN: CPT | Mod: 95

## 2023-02-22 ENCOUNTER — APPOINTMENT (OUTPATIENT)
Dept: BARIATRICS/WEIGHT MGMT | Facility: CLINIC | Age: 38
End: 2023-02-22
Payer: COMMERCIAL

## 2023-02-22 VITALS — HEIGHT: 63 IN | BODY MASS INDEX: 51.91 KG/M2 | WEIGHT: 293 LBS

## 2023-02-22 DIAGNOSIS — R63.5 ABNORMAL WEIGHT GAIN: ICD-10-CM

## 2023-02-22 PROCEDURE — 97802 MEDICAL NUTRITION INDIV IN: CPT | Mod: 95

## 2023-02-23 NOTE — ASSESSMENT
[FreeTextEntry1] : 37 year old woman with longstanding history of morbid obesity undergoing work up for Laparoscopic Sleeve Gastrectomy. Patient is doing well, and lost 5 lbs since her last visit. Reports making better food and exercise choices. Nutrition and exercise guidelines reviewed with the patient. \par \par Continue protein focused meals, 3 meals a day (aim for 60 g - 70 g protein per day)\par Encourage zero calorie fluid intake (64 oz/day)\par Exercise with cardio (aim for 8k-10k steps/day), and strength training 2x/week\par Use step counter\par Weigh yourself 1x-2x/week\par Preoperative weight loss\par Continue pre-operative clearances\par Follow-up with nutritionist \par Follow-up in 2 months, in-office\par All questions answered\par \par Call with any questions or concerns\par \par Time before and after visit spent reviewing chart

## 2023-02-23 NOTE — HISTORY OF PRESENT ILLNESS
[Home] : at home, [unfilled] , at the time of the visit. [Medical Office: ([facility])___] : at the medical office located in  [Verbal consent obtained from patient] : the patient, [unfilled] [de-identified] : 37 year old woman with longstanding history of morbid obesity undergoing work up for Laparoscopic Sleeve Gastrectomy. Patient having telemedicine visit as she is sick. Patient is doing well, and lost 5 lbs since her last visit. Reports making better food choices, drinking more water, and is exercising more (ambulates for 30 minutes several times per week). Continues to struggle with emotional eating/binge eating; will start CBT with Dr. Tirado. \par \par Next nutritionist appointment 2/22/23\par

## 2023-02-25 ENCOUNTER — TRANSCRIPTION ENCOUNTER (OUTPATIENT)
Age: 38
End: 2023-02-25

## 2023-03-07 ENCOUNTER — APPOINTMENT (OUTPATIENT)
Dept: BARIATRICS/WEIGHT MGMT | Facility: CLINIC | Age: 38
End: 2023-03-07
Payer: COMMERCIAL

## 2023-03-07 VITALS — HEIGHT: 63 IN | BODY MASS INDEX: 51.91 KG/M2 | WEIGHT: 293 LBS

## 2023-03-07 PROCEDURE — 90832 PSYTX W PT 30 MINUTES: CPT | Mod: 95

## 2023-03-07 NOTE — REASON FOR VISIT
[Follow-Up Visit] : a follow-up visit for [Morbid Obesity (BMI>40)] : morbid obesity (bmi>40) [Home] : at home, [unfilled] , at the time of the visit. [Other Location: e.g. Home (Enter Location, City,State)___] : at [unfilled] [Patient] : the patient [de-identified] : Confidentiality and its limitations were explained.

## 2023-03-07 NOTE — DISCUSSION/SUMMARY
[Conventional grooming and hygiene] : Conventional grooming and hygiene [Calm, cooperative] : Calm, cooperative [No unusual behaviors, movements, etc.] : No unusual behaviors, movements, etc. [Normal Rate/Tone/Volume] : Normal Rate/Tone/Volume [Normal Range] : Normal Range [Euthymic] : Euthymic [Logical, Goal Directed] : Logical, Goal Directed [Educational materials provided] : Educational materials provided [Behavior plan developed] : Behavior plan developed [Strategies to improve adherence identified] : Strategies to improve adherence identified [Referral for external behavioral health services] : Referral for external behavioral health services [Addtnl Health & Behavior Intervention: Individual] : Additional Health and Behavior Intervention: Individual [Develop and refine illness management to behavior plan] : Develop and refine illness management to behavior plan [Identify, practice refine strategies to promote adherence to regimen] : Identify, practice refine strategies to promote adherence to regimen [de-identified] : significantly improved [de-identified] : excessive worry\par difficulties remaining consistent with behavioral and dietary changes [de-identified] : supportive spouse [FreeTextEntry1] : Based on the information presented in her initial assessment, Ms. CAROLINA is not ready to move forward with surgery at this time due excessive worry regarding complications and BMI > 60.  She will work with writer and RD on making behavioral and dietary changes. [de-identified] : MATHIEU\par Psychological factors (overeating, poor dietary choices) affecting other medical conditions (obesity and associated medical sequelae)\par Obesity\par BN, by hx, In Full Remission [FreeTextEntry3] : Recommended seeking treatment for MATHIEU. [de-identified] : 1.  have B daily\par 2.  increase water intake\par 3.  improve sleep \par 4.  recommended CBT for anxiety - seek provider through her ins co

## 2023-03-10 ENCOUNTER — NON-APPOINTMENT (OUTPATIENT)
Age: 38
End: 2023-03-10

## 2023-03-13 ENCOUNTER — APPOINTMENT (OUTPATIENT)
Dept: ENDOCRINOLOGY | Facility: CLINIC | Age: 38
End: 2023-03-13
Payer: COMMERCIAL

## 2023-03-13 VITALS
TEMPERATURE: 98 F | DIASTOLIC BLOOD PRESSURE: 80 MMHG | HEIGHT: 63 IN | HEART RATE: 102 BPM | OXYGEN SATURATION: 99 % | WEIGHT: 293 LBS | RESPIRATION RATE: 16 BRPM | SYSTOLIC BLOOD PRESSURE: 126 MMHG | BODY MASS INDEX: 51.91 KG/M2

## 2023-03-13 PROCEDURE — 36415 COLL VENOUS BLD VENIPUNCTURE: CPT

## 2023-03-13 PROCEDURE — 99214 OFFICE O/P EST MOD 30 MIN: CPT | Mod: 25

## 2023-03-13 NOTE — HISTORY OF PRESENT ILLNESS
[FreeTextEntry1] : 37 year female  f/u for thyroid and weight management. \par \par *** Mar 13, 2023 ***\par \par did not start ozempic yet. wants to try wegovy b/o insurance issues. lost 9 lbs since the last visit\par seeing Dr. Gaitan- might be able to get a sleeve by the end of the summer\par remains on MMI 2.5 mg qd\par no recent labs\par \par *** Dec 13, 2022 ***\par \par picked up ozempic but did not start yet\par taking MMI 2.5 mg qw\par gained more weight, but is trying a new diet\par seeing Dr. Gaitan in february\par last TSH- 2.5\par \par Thyr US(10/5/22)- enlarged thyroid. Multiple b/l nodules, including stable RLP 1.5 cm hypo (prev FNAd) and LUP 1.4 complex \par \par *** Aug 30, 2022 ***\par \par had a covid in may. with a severe post covid fatigue\par saw Dr. Gaitan- interested in a sleeve gastrectomy\par gaining weight.\par wegovy approved, but is on backorder\par \par *** May 11, 2022 ***\par \par on MMI 5 mg . feels ok overall, but very concerned with a progressive weight gain\par did not see a bariatric surgeon yet\par 1mg ONDST and 24h UFC- normal\par \par leptin- 40 (3.3-18.3)\par \par Thyr US (3/25/22)-right lower pole 1.6 cm hypoechoic nodule.  Left isthmic/left medial pole junction 1.3 cm complex hypoechoic nodule.\par FNA (4/4/22)-right lower pole nodule–benign follicular nodule compatible with lymphocytic thyroiditis, left upper mid pole–benign follicular nodule compatible with lymphocytic thyroiditis\par \par HPI:\par Ms. Robertson  was struggling with obesity since the age of 5 (weighing about 100 lbs at that age). She's been doing WW for many years, and was able to maintain her weight around 300 lbs. She did not have any issues getting pregnant and weighed about 340 lbs when conceived her child. She's now about 9 months postpartum, and her weight today is 363 lbs despite staying on WW. She feels that her diet that typically would help her is not working and she continues to gain weight. She has not tried anything other diet and saw a nutritionist when she was pregnant. She also tried  phentermine with a minimal success. She was not seen by an endocrinologist yet. Her family has been also struggling with obesity, and her dad weighs about 500 lbs. \par  In addition to the weight gain, she noticed some facial hair growth, scalp hair loss, new acne.  Cycle is regular, never on OCP's. Using condoms. She snores, but was never been tested for JAMES.\par Her mother was diagnosed with a thyroid cancer at the age of 32.Bonnie recalls having normal thyroid functions, but never had a formal thyroid sonogram. Denies history of radiation exposure to head and neck area in a childhood.\par

## 2023-03-13 NOTE — ASSESSMENT
[FreeTextEntry1] : 1. Morbid obesity. Cushingoid features\par - Current approaches to weight management are discussed with the patient. Suggested extensive nutritional education program. F/u with an RD. Proper dietary restrictions and exercise routines discussed. Medical weight loss therapies were reviewed with the patient- would avoid contrave and qsymia as a first line of therapy given her HTN.  prefer weekly semaglutide. Wegovy  0.25 mg qw and uptitrate( R+B). Advised to start asap. To see a bariatric surgeon as scheduled\par - genetic counseling\par \par 2. Graves disease\par - MMI 2.5 mg qd, pending labs\par - will try to downtitrate\par \par 3. MNG. Family history of thyroid cancer\par - s/p benign FNA\par - repeat thyroid US in  4/23 at [facility]\par \par RTC 3 months\par

## 2023-03-14 LAB
ALBUMIN SERPL ELPH-MCNC: 4.7 G/DL
ALP BLD-CCNC: 82 U/L
ALT SERPL-CCNC: 27 U/L
ANION GAP SERPL CALC-SCNC: 14 MMOL/L
AST SERPL-CCNC: 22 U/L
BASOPHILS # BLD AUTO: 0.08 K/UL
BASOPHILS NFR BLD AUTO: 0.7 %
BILIRUB SERPL-MCNC: 0.2 MG/DL
BUN SERPL-MCNC: 19 MG/DL
CALCIUM SERPL-MCNC: 10.4 MG/DL
CHLORIDE SERPL-SCNC: 99 MMOL/L
CO2 SERPL-SCNC: 27 MMOL/L
CREAT SERPL-MCNC: 0.85 MG/DL
EGFR: 91 ML/MIN/1.73M2
EOSINOPHIL # BLD AUTO: 0.02 K/UL
EOSINOPHIL NFR BLD AUTO: 0.2 %
ESTIMATED AVERAGE GLUCOSE: 105 MG/DL
GLUCOSE SERPL-MCNC: 101 MG/DL
HBA1C MFR BLD HPLC: 5.3 %
HCT VFR BLD CALC: 42.6 %
HGB BLD-MCNC: 13.1 G/DL
IMM GRANULOCYTES NFR BLD AUTO: 0.4 %
LYMPHOCYTES # BLD AUTO: 2.15 K/UL
LYMPHOCYTES NFR BLD AUTO: 19.1 %
MAN DIFF?: NORMAL
MCHC RBC-ENTMCNC: 23.7 PG
MCHC RBC-ENTMCNC: 30.8 GM/DL
MCV RBC AUTO: 77.2 FL
MONOCYTES # BLD AUTO: 0.41 K/UL
MONOCYTES NFR BLD AUTO: 3.7 %
NEUTROPHILS # BLD AUTO: 8.52 K/UL
NEUTROPHILS NFR BLD AUTO: 75.9 %
PLATELET # BLD AUTO: 394 K/UL
POTASSIUM SERPL-SCNC: 4.8 MMOL/L
PROT SERPL-MCNC: 8.3 G/DL
RBC # BLD: 5.52 M/UL
RBC # FLD: 14.7 %
SODIUM SERPL-SCNC: 140 MMOL/L
T3 SERPL-MCNC: 86 NG/DL
T4 FREE SERPL-MCNC: 1.1 NG/DL
TSH SERPL-ACNC: 0.5 UIU/ML
WBC # FLD AUTO: 11.23 K/UL

## 2023-03-16 ENCOUNTER — APPOINTMENT (OUTPATIENT)
Dept: BARIATRICS/WEIGHT MGMT | Facility: CLINIC | Age: 38
End: 2023-03-16
Payer: COMMERCIAL

## 2023-03-16 LAB — TSI ACT/NOR SER: 0.17 IU/L

## 2023-03-16 PROCEDURE — 97803 MED NUTRITION INDIV SUBSEQ: CPT | Mod: 95

## 2023-03-17 LAB — TSH RECEPTOR AB: <1.1 IU/L

## 2023-03-21 VITALS — BODY MASS INDEX: 51.91 KG/M2 | WEIGHT: 293 LBS | HEIGHT: 63 IN

## 2023-04-06 ENCOUNTER — TRANSCRIPTION ENCOUNTER (OUTPATIENT)
Age: 38
End: 2023-04-06

## 2023-04-09 ENCOUNTER — NON-APPOINTMENT (OUTPATIENT)
Age: 38
End: 2023-04-09

## 2023-04-18 ENCOUNTER — APPOINTMENT (OUTPATIENT)
Dept: BARIATRICS/WEIGHT MGMT | Facility: CLINIC | Age: 38
End: 2023-04-18
Payer: COMMERCIAL

## 2023-04-18 PROCEDURE — 90832 PSYTX W PT 30 MINUTES: CPT | Mod: 95

## 2023-04-18 NOTE — REASON FOR VISIT
[Follow-Up Visit] : a follow-up visit for [Morbid Obesity (BMI>40)] : morbid obesity (bmi>40) [Home] : at home, [unfilled] , at the time of the visit. [Other Location: e.g. Home (Enter Location, City,State)___] : at [unfilled] [Patient] : the patient [de-identified] : Confidentiality and its limitations were explained.

## 2023-04-18 NOTE — DISCUSSION/SUMMARY
[Conventional grooming and hygiene] : Conventional grooming and hygiene [Calm, cooperative] : Calm, cooperative [No unusual behaviors, movements, etc.] : No unusual behaviors, movements, etc. [Normal Rate/Tone/Volume] : Normal Rate/Tone/Volume [Normal Range] : Normal Range [Euthymic] : Euthymic [Logical, Goal Directed] : Logical, Goal Directed [Educational materials provided] : Educational materials provided [Behavior plan developed] : Behavior plan developed [Strategies to improve adherence identified] : Strategies to improve adherence identified [Referral for external behavioral health services] : Referral for external behavioral health services [Addtnl Health & Behavior Intervention: Individual] : Additional Health and Behavior Intervention: Individual [Develop and refine illness management to behavior plan] : Develop and refine illness management to behavior plan [Identify, practice refine strategies to promote adherence to regimen] : Identify, practice refine strategies to promote adherence to regimen [de-identified] : struggling with consistency [de-identified] : excessive worry\par difficulties remaining consistent with behavioral and dietary changes [de-identified] : supportive spouse [FreeTextEntry1] : Based on the information presented in her initial assessment, Ms. CAROLINA is not ready to move forward with surgery at this time due excessive worry regarding complications and BMI > 60.  She will work with writer and RD on making behavioral and dietary changes. [de-identified] : MATHIEU\par Psychological factors (overeating, poor dietary choices) affecting other medical conditions (obesity and associated medical sequelae)\par Obesity\par BN, by hx, In Full Remission [FreeTextEntry4] : Follow up with writer naveen 4 wks [FreeTextEntry3] : Recommended seeking treatment for MATHIEU. [de-identified] : 1.  have B daily\par 2.  increase water intake\par 3.  improve sleep \par 4.  recommended CBT for anxiety - seek provider through her ins co

## 2023-04-27 ENCOUNTER — APPOINTMENT (OUTPATIENT)
Dept: BARIATRICS/WEIGHT MGMT | Facility: CLINIC | Age: 38
End: 2023-04-27
Payer: COMMERCIAL

## 2023-04-27 PROCEDURE — 97803 MED NUTRITION INDIV SUBSEQ: CPT | Mod: 95

## 2023-05-01 ENCOUNTER — RX RENEWAL (OUTPATIENT)
Age: 38
End: 2023-05-01

## 2023-05-01 VITALS — WEIGHT: 293 LBS | BODY MASS INDEX: 51.91 KG/M2 | HEIGHT: 63 IN

## 2023-05-01 RX ORDER — METHIMAZOLE 5 MG/1
5 TABLET ORAL DAILY
Qty: 30 | Refills: 5 | Status: ACTIVE | COMMUNITY
Start: 2022-03-28 | End: 1900-01-01

## 2023-05-09 ENCOUNTER — TRANSCRIPTION ENCOUNTER (OUTPATIENT)
Age: 38
End: 2023-05-09

## 2023-06-01 ENCOUNTER — APPOINTMENT (OUTPATIENT)
Dept: BARIATRICS/WEIGHT MGMT | Facility: CLINIC | Age: 38
End: 2023-06-01

## 2023-06-19 ENCOUNTER — APPOINTMENT (OUTPATIENT)
Dept: ENDOCRINOLOGY | Facility: CLINIC | Age: 38
End: 2023-06-19
Payer: COMMERCIAL

## 2023-06-19 VITALS
SYSTOLIC BLOOD PRESSURE: 128 MMHG | BODY MASS INDEX: 51.91 KG/M2 | HEIGHT: 63 IN | HEART RATE: 82 BPM | RESPIRATION RATE: 16 BRPM | TEMPERATURE: 97.3 F | WEIGHT: 293 LBS | DIASTOLIC BLOOD PRESSURE: 74 MMHG | OXYGEN SATURATION: 98 %

## 2023-06-19 PROCEDURE — 99214 OFFICE O/P EST MOD 30 MIN: CPT | Mod: 25

## 2023-06-19 PROCEDURE — 36415 COLL VENOUS BLD VENIPUNCTURE: CPT

## 2023-06-19 NOTE — HISTORY OF PRESENT ILLNESS
[FreeTextEntry1] : 38 year female  f/u for thyroid and weight management. \par \par *** Jun 19, 2023 ***\par \par wegovy was denied. unfortunately gained 10 lbs since the last visit despite trying to keep the diet\par feels very tired.\par saw cardio, palnned for GI and pre-op . needs to lose 35 lbs prior to the surgery\par remains on MMI 2.5 mg qd\par no recent labs\par \par Thyroid ultrasound (5/18/2023)–  right lobe–6.0 cm, left lobe–6.0 cm.  Multiple bilateral mostly subcentimeter cystic nodules.  Stable right lower pole 1.5 cm hypoechoic nodule (previously biopsied).  Stable left upper pole 1.4 cm complex hypoechoic nodule in the at the junction of the each month.\par \par *** Mar 13, 2023 ***\par \par did not start ozempic yet. wants to try wegovy b/o insurance issues. lost 9 lbs since the last visit\par seeing Dr. Gaitan- might be able to get a sleeve by the end of the summer\par remains on MMI 2.5 mg qd\par no recent labs\par \par *** Dec 13, 2022 ***\par \par picked up ozempic but did not start yet\par taking MMI 2.5 mg qw\par gained more weight, but is trying a new diet\par seeing Dr. Gaitan in february\par last TSH- 2.5\par \par Thyr US(10/5/22)- enlarged thyroid. Multiple b/l nodules, including stable RLP 1.5 cm hypo (prev FNAd) and LUP 1.4 complex \par \par *** Aug 30, 2022 ***\par \par had a covid in may. with a severe post covid fatigue\par saw Dr. Gaitan- interested in a sleeve gastrectomy\par gaining weight.\par wegovy approved, but is on backorder\par \par *** May 11, 2022 ***\par \par on MMI 5 mg . feels ok overall, but very concerned with a progressive weight gain\par did not see a bariatric surgeon yet\par 1mg ONDST and 24h UFC- normal\par \par leptin- 40 (3.3-18.3)\par \par Thyr US (3/25/22)-right lower pole 1.6 cm hypoechoic nodule.  Left isthmic/left medial pole junction 1.3 cm complex hypoechoic nodule.\par FNA (4/4/22)-right lower pole nodule–benign follicular nodule compatible with lymphocytic thyroiditis, left upper mid pole–benign follicular nodule compatible with lymphocytic thyroiditis\par \par HPI:\par Ms. Robertson  was struggling with obesity since the age of 5 (weighing about 100 lbs at that age). She's been doing WW for many years, and was able to maintain her weight around 300 lbs. She did not have any issues getting pregnant and weighed about 340 lbs when conceived her child. She's now about 9 months postpartum, and her weight today is 363 lbs despite staying on WW. She feels that her diet that typically would help her is not working and she continues to gain weight. She has not tried anything other diet and saw a nutritionist when she was pregnant. She also tried  phentermine with a minimal success. She was not seen by an endocrinologist yet. Her family has been also struggling with obesity, and her dad weighs about 500 lbs. \par  In addition to the weight gain, she noticed some facial hair growth, scalp hair loss, new acne.  Cycle is regular, never on OCP's. Using condoms. She snores, but was never been tested for JAMES.\par Her mother was diagnosed with a thyroid cancer at the age of 32.Bonnie recalls having normal thyroid functions, but never had a formal thyroid sonogram. Denies history of radiation exposure to head and neck area in a childhood.\par

## 2023-06-19 NOTE — ASSESSMENT
[FreeTextEntry1] : 1. Morbid obesity. Cushingoid features\par - Current approaches to weight management are discussed with the patient. Suggested extensive nutritional education program. F/u with an RD. Proper dietary restrictions and exercise routines discussed. Medical weight loss therapies were reviewed with the patient- would avoid contrave and qsymia as a first line of therapy given her HTN. Still  prefer weekly semaglutide. Wegovy  0.25 mg qw and uptitrate( R+B). Will resubmit the PA. If again denied, will try Saxenda. To see a bariatric surgeon as scheduled\par - genetic counseling\par \par 2. Graves disease\par - MMI 2.5 mg qd, pending labs\par - will try to downtitrate\par \par 3. MNG. Family history of thyroid cancer\par - s/p benign FNA. Essentially stable sonogram\par - repeat thyroid US in  5/24 at [facility]\par \par RTC 3 months\par

## 2023-06-29 RX ORDER — SEMAGLUTIDE 0.5 MG/.5ML
0.5 INJECTION, SOLUTION SUBCUTANEOUS
Qty: 1 | Refills: 1 | Status: ACTIVE | COMMUNITY
Start: 2023-03-13

## 2023-07-04 ENCOUNTER — NON-APPOINTMENT (OUTPATIENT)
Age: 38
End: 2023-07-04

## 2023-07-07 ENCOUNTER — TRANSCRIPTION ENCOUNTER (OUTPATIENT)
Age: 38
End: 2023-07-07

## 2023-07-07 LAB
ALBUMIN SERPL ELPH-MCNC: 4.3 G/DL
ALP BLD-CCNC: 86 U/L
ALT SERPL-CCNC: 12 U/L
ANION GAP SERPL CALC-SCNC: 12 MMOL/L
AST SERPL-CCNC: 14 U/L
BILIRUB SERPL-MCNC: 0.2 MG/DL
BUN SERPL-MCNC: 13 MG/DL
CALCIUM SERPL-MCNC: 9.9 MG/DL
CHLORIDE SERPL-SCNC: 99 MMOL/L
CO2 SERPL-SCNC: 26 MMOL/L
CREAT SERPL-MCNC: 0.81 MG/DL
EGFR: 96 ML/MIN/1.73M2
ESTIMATED AVERAGE GLUCOSE: 108 MG/DL
GLUCOSE SERPL-MCNC: 96 MG/DL
HBA1C MFR BLD HPLC: 5.4 %
POTASSIUM SERPL-SCNC: 4.3 MMOL/L
PROT SERPL-MCNC: 7.4 G/DL
SODIUM SERPL-SCNC: 137 MMOL/L
T3 SERPL-MCNC: 148 NG/DL
T4 FREE SERPL-MCNC: 1.1 NG/DL
TSH RECEPTOR AB: <1.1 IU/L
TSH SERPL-ACNC: 1.84 UIU/ML
TSI ACT/NOR SER: 0.11 IU/L

## 2023-07-25 ENCOUNTER — APPOINTMENT (OUTPATIENT)
Dept: PULMONOLOGY | Facility: CLINIC | Age: 38
End: 2023-07-25
Payer: COMMERCIAL

## 2023-07-25 VITALS
SYSTOLIC BLOOD PRESSURE: 125 MMHG | HEIGHT: 63 IN | BODY MASS INDEX: 51.91 KG/M2 | HEART RATE: 78 BPM | TEMPERATURE: 97.9 F | OXYGEN SATURATION: 98 % | WEIGHT: 293 LBS | DIASTOLIC BLOOD PRESSURE: 82 MMHG

## 2023-07-25 DIAGNOSIS — R06.83 SNORING: ICD-10-CM

## 2023-07-25 DIAGNOSIS — Z01.818 ENCOUNTER FOR OTHER PREPROCEDURAL EXAMINATION: ICD-10-CM

## 2023-07-25 PROCEDURE — 94010 BREATHING CAPACITY TEST: CPT

## 2023-07-25 PROCEDURE — 99204 OFFICE O/P NEW MOD 45 MIN: CPT | Mod: 25

## 2023-07-25 PROCEDURE — 94729 DIFFUSING CAPACITY: CPT

## 2023-07-25 PROCEDURE — 94727 GAS DIL/WSHOT DETER LNG VOL: CPT

## 2023-07-25 RX ORDER — SEMAGLUTIDE 1.34 MG/ML
2 INJECTION, SOLUTION SUBCUTANEOUS
Qty: 3 | Refills: 11 | Status: DISCONTINUED | COMMUNITY
Start: 2022-08-30 | End: 2023-07-25

## 2023-07-25 RX ORDER — ERGOCALCIFEROL 1.25 MG/1
1.25 MG CAPSULE ORAL
Qty: 13 | Refills: 2 | Status: DISCONTINUED | COMMUNITY
Start: 2022-03-18 | End: 2023-07-25

## 2023-07-25 NOTE — HISTORY OF PRESENT ILLNESS
[Never] : never [Awakes with Dry Mouth] : awakes with dry mouth [Awakes with Headache] : awakes with headache [Daytime Somnolence] : daytime somnolence [Fatigue] : fatigue [Recent  Weight Gain] : recent weight gain [Snoring] : snoring [Witnessed Apneas] : witnessed apneas [TextBox_4] : Ms. JOANNA CAROLINA is a 38 year old woman with hypertension, hyperlipidemia, obesity is here for evaluation prior to bariatric surgery\par \par Being evaluated for bariatric surgery with Dr. Gaitan\par \par Denies known pulmonary issues in the past. A few episodes of bronchitis in her early 20s. \par Had COVID may 2022 and Dec 2022 - took a long time to recover but never hospitalized. Was on Albuterol for some time. No current inhaler use. \par Denies any cough, wheezing. Does note SOB walking up the stairs. \par \par \par ROS: \par denies fevers, chills, night sweats, unintentional weight loss\par denies known autoimmune disease\par \par PMH: HTN, HLD, Graves disease\par Meds: per chart\par All: NKDA\par SH: never smoker\par FH:grandma with emphysema (smoker)\par PMD: Dr Martin Cowart\par \par

## 2023-07-25 NOTE — ASSESSMENT
[FreeTextEntry1] : Ms. JOANNA CAROLINA is a 38 year old woman with hypertension, hyperlipidemia, obesity is here for evaluation prior to bariatric surgery\par \par #pre op evaluation - \par Pulmonary function testing today with no evidence of obstruction, normal lung volumes, normal DLCO\par -- Obtain chest x-ray\par -- The patient has multiple signs and symptoms of sleep-disordered breathing. She was referred to the [facility] for a diagnostic HST. The ramifications of JAMES and its potential therapeutic modalities were discussed with the patient. The patient was cautioned on the importance of avoiding drowsy driving.\par \par All questions answered. Patient in agreement with plan. \par Follow up after above testing \par

## 2023-08-16 ENCOUNTER — OUTPATIENT (OUTPATIENT)
Dept: OUTPATIENT SERVICES | Facility: HOSPITAL | Age: 38
LOS: 1 days | End: 2023-08-16
Payer: COMMERCIAL

## 2023-08-16 ENCOUNTER — APPOINTMENT (OUTPATIENT)
Dept: SLEEP CENTER | Facility: CLINIC | Age: 38
End: 2023-08-16
Payer: COMMERCIAL

## 2023-08-16 PROCEDURE — 95800 SLP STDY UNATTENDED: CPT | Mod: 26

## 2023-08-16 PROCEDURE — 95800 SLP STDY UNATTENDED: CPT

## 2023-08-24 DIAGNOSIS — G47.33 OBSTRUCTIVE SLEEP APNEA (ADULT) (PEDIATRIC): ICD-10-CM

## 2023-08-25 ENCOUNTER — APPOINTMENT (OUTPATIENT)
Dept: PULMONOLOGY | Facility: CLINIC | Age: 38
End: 2023-08-25
Payer: COMMERCIAL

## 2023-08-25 DIAGNOSIS — G47.33 OBSTRUCTIVE SLEEP APNEA (ADULT) (PEDIATRIC): ICD-10-CM

## 2023-08-25 PROCEDURE — 99213 OFFICE O/P EST LOW 20 MIN: CPT | Mod: 95

## 2023-08-25 NOTE — ASSESSMENT
[FreeTextEntry1] : Ms. JOANNA CAROLINA is a 38 year old woman with hypertension, hyperlipidemia, obesity is here for evaluation prior to bariatric surgery  #pre op evaluation - ongoing Pulmonary function testing today with no evidence of obstruction, normal lung volumes, normal DLCO -- Obtain chest x-ray  #JAMES - Sleep study from August 2023  shows moderate obstructive sleep apnea with AHI of 26.  There is evidence of moderate to severe oxygen desaturation.  Clarke oxygen saturation was 71% with 14.5% of the study time below 90%. -- Diagnosis and treatment options discussed with patient.  Did recommend at home APAP titration and subsequent PEP therapy.  Compliance requirements discussed.  Patient understands and agrees with plan  All questions answered. Patient in agreement with plan.  Follow up after above testing

## 2023-08-25 NOTE — HISTORY OF PRESENT ILLNESS
[Home] : at home, [unfilled] , at the time of the visit. [Medical Office: ([facility])___] : at the medical office located in  [Verbal consent obtained from patient] : the patient, [unfilled] [Never] : never [Awakes with Dry Mouth] : awakes with dry mouth [TextBox_4] : Ms. JOANNA CAROLINA is a 38 year old woman with hypertension, hyperlipidemia, obesity is here for evaluation prior to bariatric surgery  Being evaluated for bariatric surgery with Dr. Gaitan  Denies known pulmonary issues in the past. A few episodes of bronchitis in her early 20s.  Had COVID may 2022 and Dec 2022 - took a long time to recover but never hospitalized. Was on Albuterol for some time. No current inhaler use.  Denies any cough, wheezing. Does note SOB walking up the stairs.   _______________ Interval events: Sleep study from August 2023 reviewed and discussed with patient.  Study shows moderate obstructive sleep apnea with AHI of 26.  There is evidence of moderate to severe oxygen desaturation.  Clarke oxygen saturation was 71% with 14.5% of the study time below 90%.  ROS:  denies fevers, chills, night sweats, unintentional weight loss denies known autoimmune disease  PMH: HTN, HLD, Graves disease Meds: per chart All: NKDA SH: never smoker FH:grandma with emphysema (smoker) PMD: Dr Martin Cowart   [Awakes with Headache] : awakes with headache [Daytime Somnolence] : daytime somnolence [Fatigue] : fatigue [Recent  Weight Gain] : recent weight gain [Snoring] : snoring [Witnessed Apneas] : witnessed apneas Xelvaloriez Pregnancy And Lactation Text: This medication is Pregnancy Category D and is not considered safe during pregnancy.  The risk during breast feeding is also uncertain.

## 2023-09-19 ENCOUNTER — APPOINTMENT (OUTPATIENT)
Dept: ENDOCRINOLOGY | Facility: CLINIC | Age: 38
End: 2023-09-19
Payer: COMMERCIAL

## 2023-09-19 VITALS
TEMPERATURE: 98.7 F | RESPIRATION RATE: 15 BRPM | HEART RATE: 86 BPM | OXYGEN SATURATION: 97 % | SYSTOLIC BLOOD PRESSURE: 132 MMHG | HEIGHT: 63 IN | WEIGHT: 293 LBS | DIASTOLIC BLOOD PRESSURE: 81 MMHG | BODY MASS INDEX: 51.91 KG/M2

## 2023-09-19 DIAGNOSIS — E66.01 MORBID (SEVERE) OBESITY DUE TO EXCESS CALORIES: ICD-10-CM

## 2023-09-19 DIAGNOSIS — E78.5 HYPERLIPIDEMIA, UNSPECIFIED: ICD-10-CM

## 2023-09-19 PROCEDURE — 99214 OFFICE O/P EST MOD 30 MIN: CPT | Mod: 25

## 2023-09-19 PROCEDURE — 36415 COLL VENOUS BLD VENIPUNCTURE: CPT

## 2023-09-20 ENCOUNTER — TRANSCRIPTION ENCOUNTER (OUTPATIENT)
Age: 38
End: 2023-09-20

## 2023-09-20 LAB
ALBUMIN SERPL ELPH-MCNC: 4.6 G/DL
ALP BLD-CCNC: 84 U/L
ALT SERPL-CCNC: 40 U/L
ANION GAP SERPL CALC-SCNC: 13 MMOL/L
AST SERPL-CCNC: 29 U/L
BILIRUB SERPL-MCNC: 0.2 MG/DL
BUN SERPL-MCNC: 18 MG/DL
CALCIUM SERPL-MCNC: 9.6 MG/DL
CHLORIDE SERPL-SCNC: 99 MMOL/L
CO2 SERPL-SCNC: 25 MMOL/L
CREAT SERPL-MCNC: 0.9 MG/DL
EGFR: 84 ML/MIN/1.73M2
ESTIMATED AVERAGE GLUCOSE: 105 MG/DL
GLUCOSE SERPL-MCNC: 88 MG/DL
HBA1C MFR BLD HPLC: 5.3 %
POTASSIUM SERPL-SCNC: 4.2 MMOL/L
PROT SERPL-MCNC: 7.5 G/DL
SODIUM SERPL-SCNC: 138 MMOL/L
T3 SERPL-MCNC: 131 NG/DL
T4 FREE SERPL-MCNC: 1.2 NG/DL
TSH SERPL-ACNC: 1.14 UIU/ML

## 2023-09-24 LAB
TSH RECEPTOR AB: <1.1 IU/L
TSI ACT/NOR SER: 0.11 IU/L

## 2023-10-03 ENCOUNTER — APPOINTMENT (OUTPATIENT)
Dept: SLEEP CENTER | Facility: CLINIC | Age: 38
End: 2023-10-03
Payer: COMMERCIAL

## 2023-10-03 PROCEDURE — ZZZZZ: CPT

## 2023-10-06 ENCOUNTER — APPOINTMENT (OUTPATIENT)
Dept: SLEEP CENTER | Facility: CLINIC | Age: 38
End: 2023-10-06
Payer: COMMERCIAL

## 2023-10-06 ENCOUNTER — OUTPATIENT (OUTPATIENT)
Dept: OUTPATIENT SERVICES | Facility: HOSPITAL | Age: 38
LOS: 1 days | End: 2023-10-06
Payer: COMMERCIAL

## 2023-10-06 PROCEDURE — G0400: CPT

## 2023-10-06 PROCEDURE — G0400: CPT | Mod: 26

## 2023-10-16 DIAGNOSIS — G47.33 OBSTRUCTIVE SLEEP APNEA (ADULT) (PEDIATRIC): ICD-10-CM

## 2023-10-19 ENCOUNTER — APPOINTMENT (OUTPATIENT)
Dept: PULMONOLOGY | Facility: CLINIC | Age: 38
End: 2023-10-19
Payer: COMMERCIAL

## 2023-10-19 PROCEDURE — 99442: CPT

## 2023-10-31 ENCOUNTER — TRANSCRIPTION ENCOUNTER (OUTPATIENT)
Age: 38
End: 2023-10-31

## 2023-12-21 ENCOUNTER — APPOINTMENT (OUTPATIENT)
Dept: ENDOCRINOLOGY | Facility: CLINIC | Age: 38
End: 2023-12-21
Payer: COMMERCIAL

## 2023-12-21 VITALS
HEIGHT: 63 IN | BODY MASS INDEX: 51.91 KG/M2 | TEMPERATURE: 97.6 F | WEIGHT: 293 LBS | RESPIRATION RATE: 16 BRPM | SYSTOLIC BLOOD PRESSURE: 128 MMHG | OXYGEN SATURATION: 98 % | HEART RATE: 71 BPM | DIASTOLIC BLOOD PRESSURE: 70 MMHG

## 2023-12-21 PROCEDURE — 36415 COLL VENOUS BLD VENIPUNCTURE: CPT

## 2023-12-21 PROCEDURE — 99214 OFFICE O/P EST MOD 30 MIN: CPT | Mod: 25

## 2023-12-21 NOTE — HISTORY OF PRESENT ILLNESS
[FreeTextEntry1] : 38 year female  f/u for thyroid and weight management.   *** Dec 21, 2023 ***  still unable to obtain wegovy (backorder), but trying hard on her own lost more weight- totalloss ~ 40 lbs over the past 6 months not approved for bariatric d/t her sleep apnea and h/o SVT. using CPAP under stress b/o 's health (treated for steroids induced diabetes) remains on MMI 2.5 mg TIW  *** Sep 19, 2023 ***  wegovy is approved but unable to get it (on a backorder) took wegovy 0.25 mg samples- tolerated well was able to lose 20 lbs on her own- very strict diet/ exercising dx'ed with JAMES- planned to start CPAP  next month. hopefully to be cleared by pulmonologist for a bariatric sx after stats using it stays on MMI 2.5 mg qod  *** Jun 19, 2023 ***  wegovy was denied. unfortunately gained 10 lbs since the last visit despite trying to keep the diet feels very tired. saw cardio, palnned for GI and pre-op . needs to lose 35 lbs prior to the surgery remains on MMI 2.5 mg qd no recent labs  Thyroid ultrasound (5/18/2023)-  right lobe-6.0 cm, left lobe-6.0 cm.  Multiple bilateral mostly subcentimeter cystic nodules.  Stable right lower pole 1.5 cm hypoechoic nodule (previously biopsied).  Stable left upper pole 1.4 cm complex hypoechoic nodule in the at the junction of the each month.  *** Mar 13, 2023 ***  did not start ozempic yet. wants to try wegovy b/o insurance issues. lost 9 lbs since the last visit seeing Dr. Gaitan- might be able to get a sleeve by the end of the summer remains on MMI 2.5 mg qd no recent labs  *** Dec 13, 2022 ***  picked up ozempic but did not start yet taking MMI 2.5 mg qw gained more weight, but is trying a new diet seeing Dr. Gaitan in february last TSH- 2.5  Thyr US(10/5/22)- enlarged thyroid. Multiple b/l nodules, including stable RLP 1.5 cm hypo (prev FNAd) and LUP 1.4 complex   *** Aug 30, 2022 ***  had a covid in may. with a severe post covid fatigue saw Dr. Gaitan- interested in a sleeve gastrectomy gaining weight. wegovy approved, but is on backorder  *** May 11, 2022 ***  on MMI 5 mg . feels ok overall, but very concerned with a progressive weight gain did not see a bariatric surgeon yet 1mg ONDST and 24h UFC- normal  leptin- 40 (3.3-18.3)  Thyr US (3/25/22)-right lower pole 1.6 cm hypoechoic nodule.  Left isthmic/left medial pole junction 1.3 cm complex hypoechoic nodule. FNA (4/4/22)-right lower pole nodule-benign follicular nodule compatible with lymphocytic thyroiditis, left upper mid pole-benign follicular nodule compatible with lymphocytic thyroiditis  HPI: Ms. Robertson  was struggling with obesity since the age of 5 (weighing about 100 lbs at that age). She's been doing WW for many years, and was able to maintain her weight around 300 lbs. She did not have any issues getting pregnant and weighed about 340 lbs when conceived her child. She's now about 9 months postpartum, and her weight today is 363 lbs despite staying on WW. She feels that her diet that typically would help her is not working and she continues to gain weight. She has not tried anything other diet and saw a nutritionist when she was pregnant. She also tried  phentermine with a minimal success. She was not seen by an endocrinologist yet. Her family has been also struggling with obesity, and her dad weighs about 500 lbs.   In addition to the weight gain, she noticed some facial hair growth, scalp hair loss, new acne.  Cycle is regular, never on OCP's. Using condoms. She snores, but was never been tested for JAMES. Her mother was diagnosed with a thyroid cancer at the age of 32.Bonnie recalls having normal thyroid functions, but never had a formal thyroid sonogram. Denies history of radiation exposure to head and neck area in a childhood.

## 2023-12-21 NOTE — ASSESSMENT
[FreeTextEntry1] : 1. Morbid obesity. Cushingoid features - Current approaches to weight management are discussed with the patient. Suggested extensive nutritional education program. F/u with an RD. Proper dietary restrictions and exercise routines discussed. Medical weight loss therapies were reviewed with the patient- would avoid contrave and qsymia as a first line of therapy given her HTN.Will try to obtain PA on Zepbound (R+B). To see a bariatric surgeon as scheduled - genetic counseling  2. Graves disease - MMI 2.5 mg tiw, pending labs - will try to further downtitrate to stopping  3. MNG. Family history of thyroid cancer - s/p benign FNA. Essentially stable sonogram - repeat thyroid US in 5/24 at [facility]  RTC 3 months.

## 2023-12-26 RX ORDER — TIRZEPATIDE 2.5 MG/.5ML
2.5 INJECTION, SOLUTION SUBCUTANEOUS
Qty: 4 | Refills: 4 | Status: ACTIVE | COMMUNITY
Start: 2023-12-21

## 2023-12-29 LAB
ALBUMIN SERPL ELPH-MCNC: 4.3 G/DL
ALP BLD-CCNC: 85 U/L
ALT SERPL-CCNC: 11 U/L
ANION GAP SERPL CALC-SCNC: 16 MMOL/L
AST SERPL-CCNC: 12 U/L
BASOPHILS # BLD AUTO: 0.07 K/UL
BASOPHILS NFR BLD AUTO: 0.7 %
BILIRUB SERPL-MCNC: 0.2 MG/DL
BUN SERPL-MCNC: 17 MG/DL
CALCIUM SERPL-MCNC: 9.4 MG/DL
CHLORIDE SERPL-SCNC: 99 MMOL/L
CO2 SERPL-SCNC: 25 MMOL/L
CREAT SERPL-MCNC: 0.97 MG/DL
EGFR: 77 ML/MIN/1.73M2
EOSINOPHIL # BLD AUTO: 0.1 K/UL
EOSINOPHIL NFR BLD AUTO: 1 %
ESTIMATED AVERAGE GLUCOSE: 100 MG/DL
GLUCOSE SERPL-MCNC: 76 MG/DL
HBA1C MFR BLD HPLC: 5.1 %
HCT VFR BLD CALC: 38.4 %
HGB BLD-MCNC: 11.9 G/DL
IMM GRANULOCYTES NFR BLD AUTO: 0.3 %
LYMPHOCYTES # BLD AUTO: 2.44 K/UL
LYMPHOCYTES NFR BLD AUTO: 24.3 %
MAN DIFF?: NORMAL
MCHC RBC-ENTMCNC: 24.6 PG
MCHC RBC-ENTMCNC: 31 GM/DL
MCV RBC AUTO: 79.5 FL
MONOCYTES # BLD AUTO: 0.63 K/UL
MONOCYTES NFR BLD AUTO: 6.3 %
NEUTROPHILS # BLD AUTO: 6.78 K/UL
NEUTROPHILS NFR BLD AUTO: 67.4 %
PLATELET # BLD AUTO: 321 K/UL
POTASSIUM SERPL-SCNC: 4.2 MMOL/L
PROT SERPL-MCNC: 7.3 G/DL
RBC # BLD: 4.83 M/UL
RBC # FLD: 14.2 %
SODIUM SERPL-SCNC: 139 MMOL/L
T3 SERPL-MCNC: 144 NG/DL
T4 FREE SERPL-MCNC: 1.2 NG/DL
TSH RECEPTOR AB: <1.1 IU/L
TSH SERPL-ACNC: 1.7 UIU/ML
TSI ACT/NOR SER: <0.1 IU/L
WBC # FLD AUTO: 10.05 K/UL

## 2024-02-28 ENCOUNTER — NON-APPOINTMENT (OUTPATIENT)
Age: 39
End: 2024-02-28

## 2024-04-01 ENCOUNTER — APPOINTMENT (OUTPATIENT)
Dept: ENDOCRINOLOGY | Facility: CLINIC | Age: 39
End: 2024-04-01
Payer: COMMERCIAL

## 2024-04-01 VITALS
WEIGHT: 293 LBS | OXYGEN SATURATION: 100 % | BODY MASS INDEX: 51.91 KG/M2 | HEIGHT: 63 IN | HEART RATE: 66 BPM | SYSTOLIC BLOOD PRESSURE: 124 MMHG | DIASTOLIC BLOOD PRESSURE: 75 MMHG

## 2024-04-01 DIAGNOSIS — E05.00 THYROTOXICOSIS WITH DIFFUSE GOITER W/OUT THYROTOXIC CRISIS OR STORM: ICD-10-CM

## 2024-04-01 DIAGNOSIS — E66.01 MORBID (SEVERE) OBESITY DUE TO EXCESS CALORIES: ICD-10-CM

## 2024-04-01 DIAGNOSIS — E04.2 NONTOXIC MULTINODULAR GOITER: ICD-10-CM

## 2024-04-01 LAB
ALBUMIN SERPL ELPH-MCNC: 4.2 G/DL
ALP BLD-CCNC: 78 U/L
ALT SERPL-CCNC: 10 U/L
ANION GAP SERPL CALC-SCNC: 12 MMOL/L
AST SERPL-CCNC: 10 U/L
BASOPHILS # BLD AUTO: 0.06 K/UL
BASOPHILS NFR BLD AUTO: 1 %
BILIRUB SERPL-MCNC: 0.3 MG/DL
BUN SERPL-MCNC: 15 MG/DL
CALCIUM SERPL-MCNC: 9.2 MG/DL
CHLORIDE SERPL-SCNC: 102 MMOL/L
CO2 SERPL-SCNC: 26 MMOL/L
CREAT SERPL-MCNC: 0.82 MG/DL
EGFR: 94 ML/MIN/1.73M2
EOSINOPHIL # BLD AUTO: 0.16 K/UL
EOSINOPHIL NFR BLD AUTO: 2.6 %
GLUCOSE SERPL-MCNC: 84 MG/DL
HCT VFR BLD CALC: 39.8 %
HGB BLD-MCNC: 12.6 G/DL
IMM GRANULOCYTES NFR BLD AUTO: 0.3 %
LYMPHOCYTES # BLD AUTO: 2.13 K/UL
LYMPHOCYTES NFR BLD AUTO: 35 %
MAN DIFF?: NORMAL
MCHC RBC-ENTMCNC: 25 PG
MCHC RBC-ENTMCNC: 31.7 GM/DL
MCV RBC AUTO: 79.1 FL
MONOCYTES # BLD AUTO: 0.43 K/UL
MONOCYTES NFR BLD AUTO: 7.1 %
NEUTROPHILS # BLD AUTO: 3.28 K/UL
NEUTROPHILS NFR BLD AUTO: 54 %
PLATELET # BLD AUTO: 292 K/UL
POTASSIUM SERPL-SCNC: 4 MMOL/L
PROT SERPL-MCNC: 7.4 G/DL
RBC # BLD: 5.03 M/UL
RBC # FLD: 14.9 %
SODIUM SERPL-SCNC: 140 MMOL/L
T3 SERPL-MCNC: 131 NG/DL
T4 FREE SERPL-MCNC: 1.2 NG/DL
TSH SERPL-ACNC: 0.92 UIU/ML
WBC # FLD AUTO: 6.08 K/UL

## 2024-04-01 PROCEDURE — 99214 OFFICE O/P EST MOD 30 MIN: CPT | Mod: 25

## 2024-04-01 PROCEDURE — 36415 COLL VENOUS BLD VENIPUNCTURE: CPT

## 2024-04-01 RX ORDER — VERAPAMIL HYDROCHLORIDE 120 MG/1
120 TABLET ORAL
Refills: 0 | Status: ACTIVE | COMMUNITY

## 2024-04-01 NOTE — ASSESSMENT
[FreeTextEntry1] : 1. Morbid obesity. Cushingoid features - Current approaches to weight management are discussed with the patient. Suggested extensive nutritional education program. F/u with an RD. Proper dietary restrictions and exercise routines discussed. Medical weight loss therapies were reviewed with the patient- would avoid contrave and qsymia as a first line of therapy given her HTN. Will try to obtain PA on Zepbound (R+B). To see a bariatric surgeon as scheduled - genetic counseling  2. Graves disease, appears in remission - will observe off MMI and repeat labs today  3. MNG. Family history of thyroid cancer - s/p benign FNA. Essentially stable sonogram - repeat thyroid US in 5/24 at [facility]  RTC 3 months.

## 2024-04-01 NOTE — HISTORY OF PRESENT ILLNESS
[FreeTextEntry1] : 38 year female  f/u for thyroid and weight management.   *** Apr 01, 2024 ***  was not able to exercise during the winter time weight has been the same, but did not lose anything picked up Zepbound, but did not start yet ("fear of side effects') using CPAP, but received a wrong size mask so was not sleeping well has been off MMI since the last visit  *** Dec 21, 2023 ***  still unable to obtain wegovy (backorder), but trying hard on her own lost more weight- total loss ~ 40 lbs over the past 6 months not approved for bariatric d/t her sleep apnea and h/o SVT. using CPAP under stress b/o 's health (treated for steroids induced diabetes) remains on MMI 2.5 mg TIW  *** Sep 19, 2023 ***  wegovy is approved but unable to get it (on a backorder) took wegovy 0.25 mg samples- tolerated well was able to lose 20 lbs on her own- very strict diet/ exercising dx'ed with JAMES- planned to start CPAP  next month. hopefully to be cleared by pulmonologist for a bariatric sx after stats using it stays on MMI 2.5 mg qod  *** Jun 19, 2023 ***  wegovy was denied. unfortunately gained 10 lbs since the last visit despite trying to keep the diet feels very tired. saw cardio, palnned for GI and pre-op . needs to lose 35 lbs prior to the surgery remains on MMI 2.5 mg qd no recent labs  Thyroid ultrasound (5/18/2023)-  right lobe-6.0 cm, left lobe-6.0 cm.  Multiple bilateral mostly subcentimeter cystic nodules.  Stable right lower pole 1.5 cm hypoechoic nodule (previously biopsied).  Stable left upper pole 1.4 cm complex hypoechoic nodule in the at the junction of the each month.  *** Mar 13, 2023 ***  did not start ozempic yet. wants to try wegovy b/o insurance issues. lost 9 lbs since the last visit seeing Dr. Gaitan- might be able to get a sleeve by the end of the summer remains on MMI 2.5 mg qd no recent labs  *** Dec 13, 2022 ***  picked up ozempic but did not start yet taking MMI 2.5 mg qw gained more weight, but is trying a new diet seeing Dr. Gaitan in february last TSH- 2.5  Thyr US(10/5/22)- enlarged thyroid. Multiple b/l nodules, including stable RLP 1.5 cm hypo (prev FNAd) and LUP 1.4 complex   *** Aug 30, 2022 ***  had a covid in may. with a severe post covid fatigue saw Dr. Gaitan- interested in a sleeve gastrectomy gaining weight. wegovy approved, but is on backorder  *** May 11, 2022 ***  on MMI 5 mg . feels ok overall, but very concerned with a progressive weight gain did not see a bariatric surgeon yet 1mg ONDST and 24h UFC- normal  leptin- 40 (3.3-18.3)  Thyr US (3/25/22)-right lower pole 1.6 cm hypoechoic nodule.  Left isthmic/left medial pole junction 1.3 cm complex hypoechoic nodule. FNA (4/4/22)-right lower pole nodule-benign follicular nodule compatible with lymphocytic thyroiditis, left upper mid pole-benign follicular nodule compatible with lymphocytic thyroiditis  HPI: Ms. Robertson  was struggling with obesity since the age of 5 (weighing about 100 lbs at that age). She's been doing WW for many years, and was able to maintain her weight around 300 lbs. She did not have any issues getting pregnant and weighed about 340 lbs when conceived her child. She's now about 9 months postpartum, and her weight today is 363 lbs despite staying on WW. She feels that her diet that typically would help her is not working and she continues to gain weight. She has not tried anything other diet and saw a nutritionist when she was pregnant. She also tried  phentermine with a minimal success. She was not seen by an endocrinologist yet. Her family has been also struggling with obesity, and her dad weighs about 500 lbs.   In addition to the weight gain, she noticed some facial hair growth, scalp hair loss, new acne.  Cycle is regular, never on OCP's. Using condoms. She snores, but was never been tested for JAMES. Her mother was diagnosed with a thyroid cancer at the age of 32.Bonnie recalls having normal thyroid functions, but never had a formal thyroid sonogram. Denies history of radiation exposure to head and neck area in a childhood.

## 2024-04-02 LAB
ESTIMATED AVERAGE GLUCOSE: 103 MG/DL
HBA1C MFR BLD HPLC: 5.2 %

## 2024-04-03 LAB — TSI ACT/NOR SER: <0.1 IU/L

## 2024-04-04 LAB — TSH RECEPTOR AB: 1.3 IU/L

## 2024-04-29 ENCOUNTER — TRANSCRIPTION ENCOUNTER (OUTPATIENT)
Age: 39
End: 2024-04-29

## 2024-07-17 ENCOUNTER — APPOINTMENT (OUTPATIENT)
Dept: ENDOCRINOLOGY | Facility: CLINIC | Age: 39
End: 2024-07-17
Payer: COMMERCIAL

## 2024-07-17 VITALS
RESPIRATION RATE: 16 BRPM | HEIGHT: 63 IN | BODY MASS INDEX: 51.91 KG/M2 | TEMPERATURE: 97.7 F | WEIGHT: 293 LBS | SYSTOLIC BLOOD PRESSURE: 133 MMHG | DIASTOLIC BLOOD PRESSURE: 80 MMHG | HEART RATE: 65 BPM | OXYGEN SATURATION: 99 %

## 2024-07-17 DIAGNOSIS — E78.5 HYPERLIPIDEMIA, UNSPECIFIED: ICD-10-CM

## 2024-07-17 DIAGNOSIS — I10 ESSENTIAL (PRIMARY) HYPERTENSION: ICD-10-CM

## 2024-07-17 DIAGNOSIS — E55.9 VITAMIN D DEFICIENCY, UNSPECIFIED: ICD-10-CM

## 2024-07-17 DIAGNOSIS — E66.01 MORBID (SEVERE) OBESITY DUE TO EXCESS CALORIES: ICD-10-CM

## 2024-07-17 DIAGNOSIS — E04.2 NONTOXIC MULTINODULAR GOITER: ICD-10-CM

## 2024-07-17 DIAGNOSIS — E05.00 THYROTOXICOSIS WITH DIFFUSE GOITER W/OUT THYROTOXIC CRISIS OR STORM: ICD-10-CM

## 2024-07-17 LAB
BASOPHILS # BLD AUTO: 0.06 K/UL
BASOPHILS NFR BLD AUTO: 0.9 %
EOSINOPHIL # BLD AUTO: 0.14 K/UL
EOSINOPHIL NFR BLD AUTO: 2.1 %
ESTIMATED AVERAGE GLUCOSE: 103 MG/DL
HBA1C MFR BLD HPLC: 5.2 %
HCT VFR BLD CALC: 41.1 %
HGB BLD-MCNC: 13.5 G/DL
IMM GRANULOCYTES NFR BLD AUTO: 0.5 %
LYMPHOCYTES # BLD AUTO: 2.1 K/UL
LYMPHOCYTES NFR BLD AUTO: 31.8 %
MAN DIFF?: NORMAL
MCHC RBC-ENTMCNC: 25.7 PG
MCHC RBC-ENTMCNC: 32.8 GM/DL
MCV RBC AUTO: 78.1 FL
MONOCYTES # BLD AUTO: 0.46 K/UL
MONOCYTES NFR BLD AUTO: 7 %
NEUTROPHILS # BLD AUTO: 3.81 K/UL
NEUTROPHILS NFR BLD AUTO: 57.7 %
PLATELET # BLD AUTO: 265 K/UL
RBC # BLD: 5.26 M/UL
RBC # FLD: 14.6 %
WBC # FLD AUTO: 6.6 K/UL

## 2024-07-17 PROCEDURE — 36415 COLL VENOUS BLD VENIPUNCTURE: CPT

## 2024-07-17 PROCEDURE — 99214 OFFICE O/P EST MOD 30 MIN: CPT | Mod: 25

## 2024-07-17 RX ORDER — TIRZEPATIDE 2.5 MG/.5ML
2.5 INJECTION, SOLUTION SUBCUTANEOUS
Qty: 1 | Refills: 0 | Status: ACTIVE | COMMUNITY
Start: 2024-07-17 | End: 1900-01-01

## 2024-07-19 LAB
25(OH)D3 SERPL-MCNC: 25.2 NG/ML
ALBUMIN SERPL ELPH-MCNC: 4.6 G/DL
ALP BLD-CCNC: 68 U/L
ALT SERPL-CCNC: 13 U/L
ANION GAP SERPL CALC-SCNC: 12 MMOL/L
AST SERPL-CCNC: 11 U/L
BILIRUB SERPL-MCNC: 0.3 MG/DL
BUN SERPL-MCNC: 16 MG/DL
CALCIUM SERPL-MCNC: 9.7 MG/DL
CHLORIDE SERPL-SCNC: 102 MMOL/L
CHOLEST SERPL-MCNC: 243 MG/DL
CO2 SERPL-SCNC: 24 MMOL/L
CREAT SERPL-MCNC: 0.85 MG/DL
EGFR: 90 ML/MIN/1.73M2
GLUCOSE SERPL-MCNC: 85 MG/DL
HDLC SERPL-MCNC: 45 MG/DL
LDLC SERPL CALC-MCNC: 159 MG/DL
NONHDLC SERPL-MCNC: 197 MG/DL
POTASSIUM SERPL-SCNC: 4.8 MMOL/L
PROT SERPL-MCNC: 7.5 G/DL
SODIUM SERPL-SCNC: 138 MMOL/L
T3 SERPL-MCNC: 133 NG/DL
T4 FREE SERPL-MCNC: 1.1 NG/DL
TRIGL SERPL-MCNC: 207 MG/DL
TSH RECEPTOR AB: 1.49 IU/L
TSH SERPL-ACNC: 1.13 UIU/ML

## 2024-07-22 LAB — TSI ACT/NOR SER: <0.1 IU/L

## 2024-07-24 ENCOUNTER — TRANSCRIPTION ENCOUNTER (OUTPATIENT)
Age: 39
End: 2024-07-24

## 2024-08-11 ENCOUNTER — NON-APPOINTMENT (OUTPATIENT)
Age: 39
End: 2024-08-11

## 2024-09-12 ENCOUNTER — TRANSCRIPTION ENCOUNTER (OUTPATIENT)
Age: 39
End: 2024-09-12

## 2024-10-14 ENCOUNTER — APPOINTMENT (OUTPATIENT)
Dept: ENDOCRINOLOGY | Facility: CLINIC | Age: 39
End: 2024-10-14
Payer: COMMERCIAL

## 2024-10-14 VITALS
HEIGHT: 63 IN | RESPIRATION RATE: 16 BRPM | OXYGEN SATURATION: 97 % | DIASTOLIC BLOOD PRESSURE: 74 MMHG | HEART RATE: 81 BPM | TEMPERATURE: 97.5 F | WEIGHT: 293 LBS | SYSTOLIC BLOOD PRESSURE: 114 MMHG | BODY MASS INDEX: 51.91 KG/M2

## 2024-10-14 DIAGNOSIS — E05.00 THYROTOXICOSIS WITH DIFFUSE GOITER W/OUT THYROTOXIC CRISIS OR STORM: ICD-10-CM

## 2024-10-14 DIAGNOSIS — I10 ESSENTIAL (PRIMARY) HYPERTENSION: ICD-10-CM

## 2024-10-14 DIAGNOSIS — E66.01 MORBID (SEVERE) OBESITY DUE TO EXCESS CALORIES: ICD-10-CM

## 2024-10-14 DIAGNOSIS — E04.2 NONTOXIC MULTINODULAR GOITER: ICD-10-CM

## 2024-10-14 DIAGNOSIS — E78.5 HYPERLIPIDEMIA, UNSPECIFIED: ICD-10-CM

## 2024-10-14 LAB
25(OH)D3 SERPL-MCNC: 25.3 NG/ML
ALBUMIN SERPL ELPH-MCNC: 4.3 G/DL
ALP BLD-CCNC: 60 U/L
ALT SERPL-CCNC: 11 U/L
ANION GAP SERPL CALC-SCNC: 13 MMOL/L
AST SERPL-CCNC: 12 U/L
BILIRUB SERPL-MCNC: 0.4 MG/DL
BUN SERPL-MCNC: 12 MG/DL
CALCIUM SERPL-MCNC: 9.3 MG/DL
CHLORIDE SERPL-SCNC: 98 MMOL/L
CHOLEST SERPL-MCNC: 213 MG/DL
CO2 SERPL-SCNC: 26 MMOL/L
CREAT SERPL-MCNC: 0.98 MG/DL
EGFR: 76 ML/MIN/1.73M2
ESTIMATED AVERAGE GLUCOSE: 97 MG/DL
FOLATE SERPL-MCNC: 5.1 NG/ML
GLUCOSE SERPL-MCNC: 64 MG/DL
HBA1C MFR BLD HPLC: 5 %
HDLC SERPL-MCNC: 35 MG/DL
LDLC SERPL CALC-MCNC: 153 MG/DL
NONHDLC SERPL-MCNC: 179 MG/DL
POTASSIUM SERPL-SCNC: 4.4 MMOL/L
PROT SERPL-MCNC: 7.3 G/DL
SODIUM SERPL-SCNC: 138 MMOL/L
T3 SERPL-MCNC: 117 NG/DL
T4 FREE SERPL-MCNC: 1.2 NG/DL
TRIGL SERPL-MCNC: 142 MG/DL
TSH SERPL-ACNC: 0.92 UIU/ML
VIT B12 SERPL-MCNC: 547 PG/ML

## 2024-10-14 PROCEDURE — 99214 OFFICE O/P EST MOD 30 MIN: CPT

## 2024-10-14 PROCEDURE — 36415 COLL VENOUS BLD VENIPUNCTURE: CPT

## 2024-10-30 ENCOUNTER — NON-APPOINTMENT (OUTPATIENT)
Age: 39
End: 2024-10-30

## 2024-11-14 ENCOUNTER — RX RENEWAL (OUTPATIENT)
Age: 39
End: 2024-11-14

## 2024-11-18 ENCOUNTER — TRANSCRIPTION ENCOUNTER (OUTPATIENT)
Age: 39
End: 2024-11-18

## 2024-12-11 ENCOUNTER — TRANSCRIPTION ENCOUNTER (OUTPATIENT)
Age: 39
End: 2024-12-11

## 2025-01-16 ENCOUNTER — APPOINTMENT (OUTPATIENT)
Dept: ENDOCRINOLOGY | Facility: CLINIC | Age: 40
End: 2025-01-16
Payer: COMMERCIAL

## 2025-01-16 VITALS
DIASTOLIC BLOOD PRESSURE: 78 MMHG | OXYGEN SATURATION: 98 % | HEIGHT: 63 IN | BODY MASS INDEX: 51.91 KG/M2 | TEMPERATURE: 97.5 F | WEIGHT: 293 LBS | RESPIRATION RATE: 16 BRPM | HEART RATE: 72 BPM | SYSTOLIC BLOOD PRESSURE: 124 MMHG

## 2025-01-16 DIAGNOSIS — E66.01 MORBID (SEVERE) OBESITY DUE TO EXCESS CALORIES: ICD-10-CM

## 2025-01-16 DIAGNOSIS — E04.2 NONTOXIC MULTINODULAR GOITER: ICD-10-CM

## 2025-01-16 DIAGNOSIS — E05.00 THYROTOXICOSIS WITH DIFFUSE GOITER W/OUT THYROTOXIC CRISIS OR STORM: ICD-10-CM

## 2025-01-16 DIAGNOSIS — I10 ESSENTIAL (PRIMARY) HYPERTENSION: ICD-10-CM

## 2025-01-16 DIAGNOSIS — E78.5 HYPERLIPIDEMIA, UNSPECIFIED: ICD-10-CM

## 2025-01-16 PROCEDURE — 99214 OFFICE O/P EST MOD 30 MIN: CPT

## 2025-01-16 PROCEDURE — 36415 COLL VENOUS BLD VENIPUNCTURE: CPT

## 2025-01-17 ENCOUNTER — TRANSCRIPTION ENCOUNTER (OUTPATIENT)
Age: 40
End: 2025-01-17

## 2025-01-17 LAB
ALBUMIN SERPL ELPH-MCNC: 4.7 G/DL
ALP BLD-CCNC: 70 U/L
ALT SERPL-CCNC: 17 U/L
ANION GAP SERPL CALC-SCNC: 13 MMOL/L
AST SERPL-CCNC: 16 U/L
BASOPHILS # BLD AUTO: 0.07 K/UL
BASOPHILS NFR BLD AUTO: 0.7 %
BILIRUB SERPL-MCNC: 0.2 MG/DL
BUN SERPL-MCNC: 22 MG/DL
CALCIUM SERPL-MCNC: 9.8 MG/DL
CHLORIDE SERPL-SCNC: 98 MMOL/L
CO2 SERPL-SCNC: 27 MMOL/L
CREAT SERPL-MCNC: 0.85 MG/DL
EGFR: 90 ML/MIN/1.73M2
EOSINOPHIL # BLD AUTO: 0.19 K/UL
EOSINOPHIL NFR BLD AUTO: 2 %
ESTIMATED AVERAGE GLUCOSE: 94 MG/DL
GLUCOSE SERPL-MCNC: 81 MG/DL
HBA1C MFR BLD HPLC: 4.9 %
HCT VFR BLD CALC: 43.2 %
HGB BLD-MCNC: 13.5 G/DL
IMM GRANULOCYTES NFR BLD AUTO: 0.3 %
LYMPHOCYTES # BLD AUTO: 2.79 K/UL
LYMPHOCYTES NFR BLD AUTO: 29.6 %
MAN DIFF?: NORMAL
MCHC RBC-ENTMCNC: 26.2 PG
MCHC RBC-ENTMCNC: 31.3 G/DL
MCV RBC AUTO: 83.7 FL
MONOCYTES # BLD AUTO: 0.59 K/UL
MONOCYTES NFR BLD AUTO: 6.3 %
NEUTROPHILS # BLD AUTO: 5.75 K/UL
NEUTROPHILS NFR BLD AUTO: 61.1 %
PLATELET # BLD AUTO: 327 K/UL
POTASSIUM SERPL-SCNC: 4.4 MMOL/L
PROT SERPL-MCNC: 7.6 G/DL
RBC # BLD: 5.16 M/UL
RBC # FLD: 13.9 %
SODIUM SERPL-SCNC: 138 MMOL/L
T3 SERPL-MCNC: 113 NG/DL
T4 FREE SERPL-MCNC: 1.4 NG/DL
TSH RECEPTOR AB: 1.2 IU/L
TSH SERPL-ACNC: 0.8 UIU/ML
WBC # FLD AUTO: 9.42 K/UL

## 2025-01-20 LAB — TSI ACT/NOR SER: <0.1 IU/L

## 2025-04-02 ENCOUNTER — TRANSCRIPTION ENCOUNTER (OUTPATIENT)
Age: 40
End: 2025-04-02

## 2025-04-03 ENCOUNTER — TRANSCRIPTION ENCOUNTER (OUTPATIENT)
Age: 40
End: 2025-04-03

## 2025-04-21 ENCOUNTER — APPOINTMENT (OUTPATIENT)
Dept: ENDOCRINOLOGY | Facility: CLINIC | Age: 40
End: 2025-04-21
Payer: COMMERCIAL

## 2025-04-21 ENCOUNTER — NON-APPOINTMENT (OUTPATIENT)
Age: 40
End: 2025-04-21

## 2025-04-21 VITALS
SYSTOLIC BLOOD PRESSURE: 138 MMHG | RESPIRATION RATE: 16 BRPM | DIASTOLIC BLOOD PRESSURE: 87 MMHG | OXYGEN SATURATION: 100 % | TEMPERATURE: 98.7 F | HEIGHT: 63 IN | HEART RATE: 72 BPM | BODY MASS INDEX: 51.91 KG/M2 | WEIGHT: 293 LBS

## 2025-04-21 DIAGNOSIS — I10 ESSENTIAL (PRIMARY) HYPERTENSION: ICD-10-CM

## 2025-04-21 DIAGNOSIS — E05.00 THYROTOXICOSIS WITH DIFFUSE GOITER W/OUT THYROTOXIC CRISIS OR STORM: ICD-10-CM

## 2025-04-21 DIAGNOSIS — E04.2 NONTOXIC MULTINODULAR GOITER: ICD-10-CM

## 2025-04-21 DIAGNOSIS — E78.5 HYPERLIPIDEMIA, UNSPECIFIED: ICD-10-CM

## 2025-04-21 DIAGNOSIS — E66.01 MORBID (SEVERE) OBESITY DUE TO EXCESS CALORIES: ICD-10-CM

## 2025-04-21 PROCEDURE — 36415 COLL VENOUS BLD VENIPUNCTURE: CPT

## 2025-04-21 PROCEDURE — 99214 OFFICE O/P EST MOD 30 MIN: CPT

## 2025-04-29 ENCOUNTER — TRANSCRIPTION ENCOUNTER (OUTPATIENT)
Age: 40
End: 2025-04-29

## 2025-04-29 LAB
ALBUMIN SERPL ELPH-MCNC: 4.1 G/DL
ALP BLD-CCNC: 63 U/L
ALT SERPL-CCNC: 16 U/L
ANION GAP SERPL CALC-SCNC: 13 MMOL/L
AST SERPL-CCNC: 13 U/L
BILIRUB SERPL-MCNC: 0.2 MG/DL
BUN SERPL-MCNC: 12 MG/DL
CALCIUM SERPL-MCNC: 9.5 MG/DL
CHLORIDE SERPL-SCNC: 100 MMOL/L
CHOLEST SERPL-MCNC: 209 MG/DL
CO2 SERPL-SCNC: 26 MMOL/L
CREAT SERPL-MCNC: 0.68 MG/DL
EGFRCR SERPLBLD CKD-EPI 2021: 114 ML/MIN/1.73M2
ESTIMATED AVERAGE GLUCOSE: 91 MG/DL
GLUCOSE SERPL-MCNC: 75 MG/DL
HBA1C MFR BLD HPLC: 4.8 %
HDLC SERPL-MCNC: 43 MG/DL
LDLC SERPL-MCNC: 139 MG/DL
NONHDLC SERPL-MCNC: 165 MG/DL
POTASSIUM SERPL-SCNC: 4.3 MMOL/L
PROT SERPL-MCNC: 6.8 G/DL
SODIUM SERPL-SCNC: 139 MMOL/L
T3 SERPL-MCNC: 120 NG/DL
T4 FREE SERPL-MCNC: 1.2 NG/DL
TRIGL SERPL-MCNC: 147 MG/DL
TSH RECEPTOR AB: <1.1 IU/L
TSH SERPL-ACNC: 0.76 UIU/ML
TSI ACT/NOR SER: 0.1 IU/L

## 2025-05-05 ENCOUNTER — APPOINTMENT (OUTPATIENT)
Dept: PULMONOLOGY | Facility: CLINIC | Age: 40
End: 2025-05-05

## 2025-05-21 ENCOUNTER — TRANSCRIPTION ENCOUNTER (OUTPATIENT)
Age: 40
End: 2025-05-21

## 2025-07-07 ENCOUNTER — TRANSCRIPTION ENCOUNTER (OUTPATIENT)
Age: 40
End: 2025-07-07

## 2025-07-22 ENCOUNTER — APPOINTMENT (OUTPATIENT)
Dept: ENDOCRINOLOGY | Facility: CLINIC | Age: 40
End: 2025-07-22
Payer: COMMERCIAL

## 2025-07-22 VITALS
HEIGHT: 63 IN | RESPIRATION RATE: 16 BRPM | SYSTOLIC BLOOD PRESSURE: 117 MMHG | DIASTOLIC BLOOD PRESSURE: 67 MMHG | WEIGHT: 285 LBS | BODY MASS INDEX: 50.5 KG/M2 | OXYGEN SATURATION: 98 % | HEART RATE: 72 BPM

## 2025-07-22 DIAGNOSIS — I10 ESSENTIAL (PRIMARY) HYPERTENSION: ICD-10-CM

## 2025-07-22 DIAGNOSIS — E66.01 MORBID (SEVERE) OBESITY DUE TO EXCESS CALORIES: ICD-10-CM

## 2025-07-22 DIAGNOSIS — E05.00 THYROTOXICOSIS WITH DIFFUSE GOITER W/OUT THYROTOXIC CRISIS OR STORM: ICD-10-CM

## 2025-07-22 DIAGNOSIS — E04.2 NONTOXIC MULTINODULAR GOITER: ICD-10-CM

## 2025-07-22 DIAGNOSIS — E55.9 VITAMIN D DEFICIENCY, UNSPECIFIED: ICD-10-CM

## 2025-07-22 DIAGNOSIS — E78.5 HYPERLIPIDEMIA, UNSPECIFIED: ICD-10-CM

## 2025-07-22 PROCEDURE — G2211 COMPLEX E/M VISIT ADD ON: CPT | Mod: NC

## 2025-07-22 PROCEDURE — 36415 COLL VENOUS BLD VENIPUNCTURE: CPT

## 2025-07-22 PROCEDURE — 99214 OFFICE O/P EST MOD 30 MIN: CPT

## 2025-07-24 ENCOUNTER — TRANSCRIPTION ENCOUNTER (OUTPATIENT)
Age: 40
End: 2025-07-24

## 2025-07-24 LAB
25(OH)D3 SERPL-MCNC: 26.5 NG/ML
ALBUMIN SERPL ELPH-MCNC: 4.5 G/DL
ALP BLD-CCNC: 54 U/L
ALT SERPL-CCNC: 20 U/L
ANION GAP SERPL CALC-SCNC: 13 MMOL/L
AST SERPL-CCNC: 16 U/L
BILIRUB SERPL-MCNC: 0.4 MG/DL
BUN SERPL-MCNC: 22 MG/DL
CALCIUM SERPL-MCNC: 9.8 MG/DL
CHLORIDE SERPL-SCNC: 102 MMOL/L
CHOLEST SERPL-MCNC: 226 MG/DL
CO2 SERPL-SCNC: 24 MMOL/L
CREAT SERPL-MCNC: 0.72 MG/DL
EGFRCR SERPLBLD CKD-EPI 2021: 109 ML/MIN/1.73M2
ESTIMATED AVERAGE GLUCOSE: 94 MG/DL
GLUCOSE SERPL-MCNC: 75 MG/DL
HBA1C MFR BLD HPLC: 4.9 %
HDLC SERPL-MCNC: 36 MG/DL
LDLC SERPL-MCNC: 167 MG/DL
NONHDLC SERPL-MCNC: 190 MG/DL
POTASSIUM SERPL-SCNC: 4.6 MMOL/L
PROT SERPL-MCNC: 7.3 G/DL
SODIUM SERPL-SCNC: 139 MMOL/L
T3 SERPL-MCNC: 125 NG/DL
T4 FREE SERPL-MCNC: 1.2 NG/DL
TRIGL SERPL-MCNC: 127 MG/DL
TSH SERPL-ACNC: 0.75 UIU/ML

## 2025-07-25 ENCOUNTER — TRANSCRIPTION ENCOUNTER (OUTPATIENT)
Age: 40
End: 2025-07-25